# Patient Record
Sex: MALE | Race: WHITE | Employment: FULL TIME | URBAN - METROPOLITAN AREA
[De-identification: names, ages, dates, MRNs, and addresses within clinical notes are randomized per-mention and may not be internally consistent; named-entity substitution may affect disease eponyms.]

---

## 2017-02-21 ENCOUNTER — TELEPHONE (OUTPATIENT)
Dept: FAMILY MEDICINE | Facility: CLINIC | Age: 47
End: 2017-02-21

## 2017-02-21 ENCOUNTER — APPOINTMENT (OUTPATIENT)
Dept: GENERAL RADIOLOGY | Facility: CLINIC | Age: 47
End: 2017-02-21
Attending: FAMILY MEDICINE
Payer: COMMERCIAL

## 2017-02-21 ENCOUNTER — HOSPITAL ENCOUNTER (EMERGENCY)
Facility: CLINIC | Age: 47
Discharge: HOME | End: 2017-02-21
Attending: FAMILY MEDICINE | Admitting: FAMILY MEDICINE
Payer: COMMERCIAL

## 2017-02-21 VITALS
SYSTOLIC BLOOD PRESSURE: 138 MMHG | TEMPERATURE: 98.4 F | DIASTOLIC BLOOD PRESSURE: 106 MMHG | BODY MASS INDEX: 26.53 KG/M2 | HEART RATE: 109 BPM | HEIGHT: 67 IN | RESPIRATION RATE: 16 BRPM | WEIGHT: 169 LBS | OXYGEN SATURATION: 100 %

## 2017-02-21 DIAGNOSIS — R05.9 COUGH: ICD-10-CM

## 2017-02-21 DIAGNOSIS — Z87.891 PERSONAL HISTORY OF TOBACCO USE, PRESENTING HAZARDS TO HEALTH: ICD-10-CM

## 2017-02-21 DIAGNOSIS — J98.01 ACUTE BRONCHOSPASM: ICD-10-CM

## 2017-02-21 DIAGNOSIS — J11.1 INFLUENZA-LIKE ILLNESS: ICD-10-CM

## 2017-02-21 DIAGNOSIS — J11.1 FLU: ICD-10-CM

## 2017-02-21 LAB
FLUAV+FLUBV AG SPEC QL: NEGATIVE
FLUAV+FLUBV AG SPEC QL: NORMAL
SPECIMEN SOURCE: NORMAL

## 2017-02-21 PROCEDURE — 87804 INFLUENZA ASSAY W/OPTIC: CPT | Performed by: EMERGENCY MEDICINE

## 2017-02-21 PROCEDURE — 25000132 ZZH RX MED GY IP 250 OP 250 PS 637: Performed by: FAMILY MEDICINE

## 2017-02-21 PROCEDURE — 94640 AIRWAY INHALATION TREATMENT: CPT | Performed by: FAMILY MEDICINE

## 2017-02-21 PROCEDURE — 71020 XR CHEST 2 VW: CPT

## 2017-02-21 PROCEDURE — 99284 EMERGENCY DEPT VISIT MOD MDM: CPT | Mod: 25 | Performed by: FAMILY MEDICINE

## 2017-02-21 PROCEDURE — 99284 EMERGENCY DEPT VISIT MOD MDM: CPT | Mod: Z6 | Performed by: FAMILY MEDICINE

## 2017-02-21 PROCEDURE — 25000308 HC RX OP HPI UCR WEL MED 250 IP 250: Performed by: FAMILY MEDICINE

## 2017-02-21 RX ORDER — ALBUTEROL SULFATE 0.83 MG/ML
2.5 SOLUTION RESPIRATORY (INHALATION) ONCE
Status: COMPLETED | OUTPATIENT
Start: 2017-02-21 | End: 2017-02-21

## 2017-02-21 RX ORDER — ALBUTEROL SULFATE 90 UG/1
2 AEROSOL, METERED RESPIRATORY (INHALATION) EVERY 4 HOURS PRN
Qty: 1 INHALER | Refills: 0 | Status: SHIPPED | OUTPATIENT
Start: 2017-02-21 | End: 2017-04-06

## 2017-02-21 RX ORDER — OSELTAMIVIR PHOSPHATE 75 MG/1
75 CAPSULE ORAL 2 TIMES DAILY
Qty: 10 CAPSULE | Refills: 0 | Status: SHIPPED | OUTPATIENT
Start: 2017-02-21 | End: 2017-02-26

## 2017-02-21 RX ORDER — ACETAMINOPHEN 500 MG
1000 TABLET ORAL ONCE
Status: COMPLETED | OUTPATIENT
Start: 2017-02-21 | End: 2017-02-21

## 2017-02-21 RX ADMIN — ACETAMINOPHEN 1000 MG: 500 TABLET, FILM COATED ORAL at 19:41

## 2017-02-21 RX ADMIN — ALBUTEROL SULFATE 2.5 MG: 2.5 SOLUTION RESPIRATORY (INHALATION) at 19:41

## 2017-02-21 ASSESSMENT — ENCOUNTER SYMPTOMS
HEADACHES: 1
FEVER: 1
ABDOMINAL PAIN: 0
WHEEZING: 1
DIAPHORESIS: 1
WEAKNESS: 1
CHILLS: 1
COUGH: 1

## 2017-02-21 NOTE — TELEPHONE ENCOUNTER
"Jayce Myers is a 46 year old male who calls with cough.    NURSING ASSESSMENT:  Description:  Patient calling with a cough - started in last 24 hours  Onset/duration:  24 hours  Precip. factors:  No influenza vaccination  Associated symptoms:    1. Cough - wheezing with coughing but not at rest    -\"hard for me to take a deep breathe\"   -SOB - no wheezing heard on the phone - patient is speaking continuously with no problems or break in words - difficult to speak with patient due to his continuous speech - worsening SOB with activity   -patient states he is having 'difficulty breathing'    2. No influenza vaccination - reports recent \"influenza like\" symptoms a couple of weeks ago  3. Cold/clammy - has not checked temperature - worsens with coughing     symptoms a few weeks ago - was not diagnosed or seen    Last exam/Treatment:  7/20/2016  Allergies:   Allergies   Allergen Reactions     Pollen Extract Itching     Hayfever       NURSING PLAN: Nursing advice to patient Writer reviewed and assessed patients SOB with him repeated and he continues to states he is having difficulty breathing - advised patient seek care at [facility] at this time - someone else to drive - 911 if unable to make it - patient verbalized understanding - agrees with plan    RECOMMENDED DISPOSITION:  To ED, another person to drive - see above  Will comply with recommendation: Yes  If further questions/concerns or if symptoms do not improve, worsen or new symptoms develop, call your PCP or [facility] as soon as possible.      Guideline used:  Telephone Triage Protocols for Nurses, Fifth Edition, Bonny Cevallos RN    "

## 2017-02-21 NOTE — ED AVS SNAPSHOT
[facility], Emergency Department    [address] [facility] [address]    Phone:  [phone]                                       Jayce Myers   MRN: [id number]    Department:  [facility], [place], Emergency Department   Date of Visit:  2/21/2017           Patient Information     Date Of Birth          1970        Your diagnoses for this visit were:     Influenza-like illness     Cough     Acute bronchospasm        You were seen by Chapo Kumar MD.      Follow-up Information     Schedule an appointment as soon as possible for a visit with Chapo Maldonado MD.    Specialty:  Family Practice    Contact information:    [facility]  [address] [facility] [address]  [facility] [address] [phone]          Discharge Instructions       Home.  Your influenza test was negative but you still have influenza symptoms.  Home from work.  Take the tamiflu as directed.  Use albuterol for wheezing and cough.  Alternate tylenol and ibuprofen every 4 hours as needed.  Push fluids and rest.  Return if any concerns.    Bronchospasm (Adult)    Bronchospasm occurs when the airways (bronchial tubes) go into spasm and contract. This makes it hard to breathe and causes wheezing (a high-pitched whistling sound). Bronchospasm can also cause frequent coughing without wheezing.  Bronchospasm is due to irritation, inflammation, or allergic reaction of the airways. People with asthma get bronchospasm. However, not everyone with bronchospasm has asthma.  Being exposed to harmful fumes, a recent case of bronchitis, exercise, or a flare-up of chronic obstructive pulmonary disease (COPD) may cause the airways to spasm. An episode of bronchospasm may last 7 to 14 days. Medicine may be prescribed to relax the airways and prevent wheezing. Antibiotics will be prescribed only if your healthcare provider thinks there is a bacterial infection. Antibiotics do not help a viral infection.  Home care     Drink lots of water or  other fluids (at least 10 glasses a day) during an attack. This will loosen lung secretions and make it easier to breathe. If you have heart or kidney disease, check with your doctor before you drink extra fluids.    Take prescribed medicine exactly at the times advised. If you take an inhaled medicine to help with breathing, do not use it more than once every 4 hours, unless told to do so. If prescribed an antibiotic or prednisone, take all of the medicine, even if you are feeling better after a few days.    Do not smoke. Also avoid being exposed to secondhand smoke.    If you were given an inhaler, use it exactly as directed. If you need to use it more often than prescribed, your condition may be getting worse. Contact your healthcare provider.  Follow-up care  Follow up with your healthcare provider, or as advised.   (Note: If you are age 65 or older, have a chronic lung disease or condition that affects your immune system, or you smoke, we recommend getting pneumococcal vaccinations, as well as an influenza vaccination (flu shot) every autumn. Ask your healthcare provider about this.)  When to seek medical advice  Call your healthcare provider right away if any of these occur:    You need to use your inhalers more often than usual.    You develop a fever of 100.4 F (38 C) or higher.    You are coughing up lots of dark-colored sputum (mucus).    You do not start to improve within 24 hours.  Call 911, or get immediate medical care  Contact emergency services if any of these occur:    Coughing up bloody sputum (mucus)    Chest pain with each breath    Increased wheezing or shortness of breath    2184-5771 [employer]. [address] [place] [address], [place] [address]. All rights reserved. This information is not intended as a substitute for professional medical care. Always follow your healthcare professional's instructions.          Discharge References/Attachments     INFLUENZA (ADULT) (ENGLISH)      24  Hour Appointment Hotline       To make an appointment at any [facility], call [phone] ([phone]). If you don't have a family doctor or clinic, we will help you find one. [facility] clinics are conveniently located to serve the needs of you and your family.             Review of your medicines      START taking        Dose / Directions Last dose taken    albuterol 108 (90 BASE) MCG/ACT Inhaler   Commonly known as:  albuterol   Dose:  2 puff   Quantity:  1 Inhaler        Inhale 2 puffs into the lungs every 4 hours as needed for shortness of breath / dyspnea   Refills:  0        oseltamivir 75 MG capsule   Commonly known as:  TAMIFLU   Dose:  75 mg   Quantity:  10 capsule        Take 1 capsule (75 mg) by mouth 2 times daily for 5 days   Refills:  0          Our records show that you are taking the medicines listed below. If these are incorrect, please call your family doctor or clinic.        Dose / Directions Last dose taken    IBUPROFEN PO   Dose:  400 mg        Take 400 mg by mouth every 4 hours as needed for moderate pain   Refills:  0                Prescriptions were sent or printed at these locations (2 Prescriptions)                   Other Prescriptions                Printed at Department/Unit printer (2 of 2)         oseltamivir (TAMIFLU) 75 MG capsule               albuterol (ALBUTEROL) 108 (90 BASE) MCG/ACT Inhaler                Procedures and tests performed during your visit     Influenza A/B antigen    XR Chest 2 Views      Orders Needing Specimen Collection     None      Pending Results     No orders found from 2/19/2017 to 2/22/2017.            Pending Culture Results     No orders found from 2/19/2017 to 2/22/2017.            Thank you for choosing [facility]       Thank you for choosing [facility] for your care. Our goal is always to provide you with excellent care. Hearing back from our patients is one way we can continue to improve our services. Please take a few minutes to complete  the written survey that you may receive in the mail after you visit with us. Thank you!        [employer] Information     [employer] gives you secure access to your electronic health record. If you see a primary care provider, you can also send messages to your care team and make appointments. If you have questions, please call your primary care clinic.  If you do not have a primary care provider, please call [phone] and they will assist you.        Care EveryWhere ID     This is your Care EveryWhere ID. This could be used by other organizations to access your [facility] medical records  [id number]        After Visit Summary       This is your record. Keep this with you and show to your community pharmacist(s) and doctor(s) at your next visit.

## 2017-02-21 NOTE — ED NOTES
Jayce comes in for 24 hours of coughing and HA and low-grade fevers. He tells me that the HA is much worse with movement and coughing.

## 2017-02-21 NOTE — ED AVS SNAPSHOT
[facility], [place], Emergency Department    [address] [place] [address]    Phone:  [phone]                                       Jayce Myers   MRN: [id number]    Department:  [facility], Emergency Department   Date of Visit:  2/21/2017           After Visit Summary Signature Page     I have received my discharge instructions, and my questions have been answered. I have discussed any challenges I see with this plan with the nurse or doctor.    ..........................................................................................................................................  Patient/Patient Representative Signature      ..........................................................................................................................................  Patient Representative Print Name and Relationship to Patient    ..................................................               ................................................  Date                                            Time    ..........................................................................................................................................  Reviewed by Signature/Title    ...................................................              ..............................................  Date                                                            Time

## 2017-02-21 NOTE — LETTER
[facility], [place], EMERGENCY DEPARTMENT  [address] [facility] [address]  [phone]    2017    Jayce Myers  [address]  [facility] [address]  [phone] (home)     : 1970      To Whom it may concern:    Jayce Myers was seen in our Emergency Department today, 2017.  I expect his condition to improve over the next few days.  He may return to work when improved.    Sincerely,        Chapo Kumar MD

## 2017-02-21 NOTE — TELEPHONE ENCOUNTER
Reason for call:  Patient reporting a symptom    Symptom or request: Has had a cough where it hurts when he coughs for the last 24hours. Is not aware of a fever if he has one. Wondering when he should decide if he should be seen or not.    Duration (how long have symptoms been present): For at least 24 hours    Have you been treated for this before?     Additional comments:     Phone Number patient can be reached at:  Home number on file [phone] (home)    Best Time:  anytime    Can we leave a detailed message on this number:  Not Applicable    Call taken on 2/21/2017 at 2:50 PM by Lucia Jones

## 2017-02-22 ASSESSMENT — ENCOUNTER SYMPTOMS
NAUSEA: 0
BACK PAIN: 0
FLANK PAIN: 0
DIARRHEA: 0
DIFFICULTY URINATING: 0
JOINT SWELLING: 0
SORE THROAT: 1
CONFUSION: 0
HEMATURIA: 0
DECREASED CONCENTRATION: 1
WOUND: 0
COLOR CHANGE: 0
NECK STIFFNESS: 0
MYALGIAS: 1
SHORTNESS OF BREATH: 0
ACTIVITY CHANGE: 1
VOMITING: 0
EYE REDNESS: 0
DYSPHORIC MOOD: 1
ARTHRALGIAS: 1

## 2017-02-22 NOTE — DISCHARGE INSTRUCTIONS
Home.  Your influenza test was negative but you still have influenza symptoms.  Home from work.  Take the tamiflu as directed.  Use albuterol for wheezing and cough.  Alternate tylenol and ibuprofen every 4 hours as needed.  Push fluids and rest.  Return if any concerns.    Bronchospasm (Adult)    Bronchospasm occurs when the airways (bronchial tubes) go into spasm and contract. This makes it hard to breathe and causes wheezing (a high-pitched whistling sound). Bronchospasm can also cause frequent coughing without wheezing.  Bronchospasm is due to irritation, inflammation, or allergic reaction of the airways. People with asthma get bronchospasm. However, not everyone with bronchospasm has asthma.  Being exposed to harmful fumes, a recent case of bronchitis, exercise, or a flare-up of chronic obstructive pulmonary disease (COPD) may cause the airways to spasm. An episode of bronchospasm may last 7 to 14 days. Medicine may be prescribed to relax the airways and prevent wheezing. Antibiotics will be prescribed only if your healthcare provider thinks there is a bacterial infection. Antibiotics do not help a viral infection.  Home care     Drink lots of water or other fluids (at least 10 glasses a day) during an attack. This will loosen lung secretions and make it easier to breathe. If you have heart or kidney disease, check with your doctor before you drink extra fluids.    Take prescribed medicine exactly at the times advised. If you take an inhaled medicine to help with breathing, do not use it more than once every 4 hours, unless told to do so. If prescribed an antibiotic or prednisone, take all of the medicine, even if you are feeling better after a few days.    Do not smoke. Also avoid being exposed to secondhand smoke.    If you were given an inhaler, use it exactly as directed. If you need to use it more often than prescribed, your condition may be getting worse. Contact your healthcare provider.  Follow-up  care  Follow up with your healthcare provider, or as advised.   (Note: If you are age 65 or older, have a chronic lung disease or condition that affects your immune system, or you smoke, we recommend getting pneumococcal vaccinations, as well as an influenza vaccination (flu shot) every autumn. Ask your healthcare provider about this.)  When to seek medical advice  Call your healthcare provider right away if any of these occur:    You need to use your inhalers more often than usual.    You develop a fever of 100.4 F (38 C) or higher.    You are coughing up lots of dark-colored sputum (mucus).    You do not start to improve within 24 hours.  Call 911, or get immediate medical care  Contact emergency services if any of these occur:    Coughing up bloody sputum (mucus)    Chest pain with each breath    Increased wheezing or shortness of breath    6153-1034 [employer]. [address] [place] [address]. All rights reserved. This information is not intended as a substitute for professional medical care. Always follow your healthcare professional's instructions.

## 2017-02-22 NOTE — ED PROVIDER NOTES
History     Chief Complaint   Patient presents with     Cough     Headache     HPI  Jayce Myers is a 46 year old male who presents with multiple complaints. The patient reports that over the past day he has been experiencing headache, cough, wheezing, subjective fever, chills, diaphoresis and generalized weakness. He did take 2 Advil at 12:30 PM today for his symptoms. The patient notes that he had a different illness 2-3 weeks ago, in which which he had been experiencing myalgias, nausea, vomiting and diarrhea which lasted a few days and eventually resolved. He has not had a flu shot this year. He denies any abdominal pain, nausea, vomiting or diarrhea currently. The patient has no history of asthma.  Patient is concerned he has 2 children home and his wife they did get flu shots a couple weeks ago.    Past Medical History   Diagnosis Date     Nevus      left leg        No past surgical history on file.    Family History   Problem Relation Age of Onset     CANCER Mother      salivary gland?     Thyroid Disease Mother      DIABETES Mother      DM2     Anemia Sister      Jayce ROLON does not known cause     Cancer - colorectal No family hx of        Social History   Substance Use Topics     Smoking status: Former Smoker     Packs/day: 0.50     Years: 3.00     Types: Cigarettes     Quit date: 1/26/2003     Smokeless tobacco: Never Used      Comment: social smoking     Alcohol use Yes      Comment: six pack over weekend     No current facility-administered medications for this encounter.      Current Outpatient Prescriptions   Medication     IBUPROFEN PO     oseltamivir (TAMIFLU) 75 MG capsule     albuterol (ALBUTEROL) 108 (90 BASE) MCG/ACT Inhaler        Allergies   Allergen Reactions     Pollen Extract Itching     Hayfever        I have reviewed the Medications, Allergies, Past Medical and Surgical History, and Social History in the Epic system.    Review of Systems   Constitutional: Positive for activity  "change, chills, diaphoresis and fever (subjective).   HENT: Positive for sore throat (Mild). Negative for congestion.    Eyes: Negative for redness.   Respiratory: Positive for cough and wheezing. Negative for shortness of breath.    Cardiovascular: Negative for chest pain.   Gastrointestinal: Negative for abdominal pain, diarrhea, nausea and vomiting.   Genitourinary: Negative for difficulty urinating, flank pain and hematuria.   Musculoskeletal: Positive for arthralgias and myalgias. Negative for back pain, joint swelling and neck stiffness.   Skin: Negative for color change, rash and wound.   Allergic/Immunologic: Negative for immunocompromised state.   Neurological: Positive for weakness (generalized weakness) and headaches.   Psychiatric/Behavioral: Positive for decreased concentration and dysphoric mood. Negative for confusion.   All other systems reviewed and are negative.      Physical Exam   BP: (!) 146/95  Pulse: 109  Temp: 99.5  F (37.5  C)  Resp: 20  Height: 170.2 cm (5' 7\")  Weight: 76.7 kg (169 lb)  SpO2: 99 %  Physical Exam   Constitutional: He is oriented to person, place, and time. He appears well-developed and well-nourished. He appears distressed.   Patient appears influenzal.   HENT:   Head: Normocephalic and atraumatic.   Oropharynx without trismus some erythema   Eyes: Conjunctivae and EOM are normal. Pupils are equal, round, and reactive to light. No scleral icterus.   Neck: Normal range of motion. Neck supple. No JVD present.   No stridor   Cardiovascular: Regular rhythm.    Pulmonary/Chest: No stridor. He is in respiratory distress (mild). He has wheezes. He has no rales. He exhibits no tenderness.   Abdominal: He exhibits no distension and no mass. There is no tenderness. There is no rebound and no guarding.   Musculoskeletal: He exhibits no edema or tenderness.   Lymphadenopathy:     He has cervical adenopathy.   Neurological: He is alert and oriented to person, place, and time. He has " normal reflexes. No cranial nerve deficit. Coordination normal.   Skin: Skin is warm and dry. No rash noted. He is not diaphoretic. No erythema. No pallor.   Psychiatric:   Mildly anxious   Nursing note and vitals reviewed.      ED Course     ED Course     Procedures     6:53 PM  The patient was seen and examined by Dr. Kumar in Room HW.           Patient evaluated here.  Influenza testing was negative chest x-ray without acute infiltrate.  Patient received a gram of Tylenol feeling somewhat better also albuterol neb did improve his symptoms.    Discussed at length with patient regarding treatment his symptoms seem to be consistent with acute influenza illness  Patient has 2 children at home along with his wife about his ability working etc. therefore we did elect to treat with Tamiflu for 5 days along with this albuterol inhaler note for work was given encouraging fluids alternate Tylenol ibuprofen and return if any concerns patient agrees with plan and comfortable being discharged feeling better.    Critical Care time:  none               Labs Ordered and Resulted from Time of ED Arrival Up to the Time of Departure from the ED   INFLUENZA A/B ANTIGEN     Results for orders placed or performed during the hospital encounter of 02/21/17 (from the past 24 hour(s))   Influenza A/B antigen   Result Value Ref Range    Influenza A/B Agn Specimen Nasopharyngeal     Influenza A Negative NEG    Influenza B  NEG     Negative   Test results must be correlated with clinical data. If necessary, results   should be confirmed by a molecular assay or viral culture.     XR Chest 2 Views    Narrative     Examination:  XR CHEST 2 VW     Date:  2/21/2017 7:30 PM      Clinical Information: sob     Additional Information: none    Comparison: none    Findings:     The lungs are well-expanded and clear. The mediastinum and heart are  normal. The osseous structures of the thorax and spine are  unremarkable. There is an incidental azygous  lobe. There are prominent  loops of small bowel in the left upper quadrant of the abdomen but  they are not distended. There is no free air.      Impression    Impression:    1. No acute disease noted within the chest or upper abdomen. .    REBEKAH JETER MD         Assessments & Plan (with Medical Decision Making)  46-year-old male presents with acute influenzal symptoms cough myalgias or arthralgias coming on suddenly low-grade fever although rapid flu negative high suspicion for influenza.  Chest x-ray negative.  With family contacts at home etc.  Patient did elect to take Tamiflu for 5 days using albuterol inhaler Tylenol ibuprofen and fluids work note and to follow-up M.D. return if any concerns.           I have reviewed the nursing notes.    I have reviewed the findings, diagnosis, plan and need for follow up with the patient.    Discharge Medication List as of 2/21/2017  9:03 PM      START taking these medications    Details   oseltamivir (TAMIFLU) 75 MG capsule Take 1 capsule (75 mg) by mouth 2 times daily for 5 days, Disp-10 capsule, R-0, Local Print      albuterol (ALBUTEROL) 108 (90 BASE) MCG/ACT Inhaler Inhale 2 puffs into the lungs every 4 hours as needed for shortness of breath / dyspnea, Disp-1 Inhaler, R-0, Local Print             Final diagnoses:   Influenza-like illness   Cough   Acute bronchospasm     IPaula, am serving as a trained medical scribe to document services personally performed by Chapo Kumar MD, based on the provider's statements to me.   IChapo MD, was physically present and have reviewed and verified the accuracy of this note documented by Paula Ramos.     2/21/2017   [facility] EMERGENCY DEPARTMENT    This note was created at least in part by the use of dragon voice dictation system. Inadvertent typographical errors may still exist.  Chapo Kumar MD.         Chapo Kumar MD  02/22/17 0042

## 2017-02-23 ENCOUNTER — TELEPHONE (OUTPATIENT)
Dept: FAMILY MEDICINE | Facility: CLINIC | Age: 47
End: 2017-02-23

## 2017-02-23 NOTE — TELEPHONE ENCOUNTER
"Patient called clinic. He was seen in the ED on 2/21/17.   He currently reports the following symptoms:     -painful throat with \"voice loss\"   -coughing up mucous  -reports no difficulty breathing  -reports no wheezing or SOB   -patient reports he has used his inhaler as prescribed  -current temperature at 1610 today was 99.0 oral   -alternating tylenol and ibuprofen, last dose at 10:00am  -patient reports that he \"feels better\" than when he was in the ED on 2/21/17  -reports good urine output, able to keep food and fluids down  -currently taking Tamiflu as prescribed    Home care instructions provided to patient. Asked patient to continue to monitor symptoms and take medications as prescribed by ED MD. Asked patient to call clinic for follow up or go to UC/ED with any new or worsening symptoms. Patient stated his understanding. He denied further questions at this time. Closing encounter.    Joanna Odell RN  [facility]                  "

## 2017-02-23 NOTE — TELEPHONE ENCOUNTER
Pt's wife is calling in regarding a f/u as he was seen in the ER and is worried since pt is sounding very hoarse after being seen    Wife Radha can be reached @ [phone] bhavna

## 2017-04-06 ENCOUNTER — APPOINTMENT (OUTPATIENT)
Dept: GENERAL RADIOLOGY | Facility: CLINIC | Age: 47
End: 2017-04-06
Attending: EMERGENCY MEDICINE
Payer: COMMERCIAL

## 2017-04-06 ENCOUNTER — HOSPITAL ENCOUNTER (OUTPATIENT)
Facility: CLINIC | Age: 47
Setting detail: OBSERVATION
Discharge: HOME | End: 2017-04-07
Attending: EMERGENCY MEDICINE | Admitting: EMERGENCY MEDICINE
Payer: COMMERCIAL

## 2017-04-06 DIAGNOSIS — R07.89 COSTOCHONDRAL CHEST PAIN: Primary | ICD-10-CM

## 2017-04-06 DIAGNOSIS — R07.9 CHEST PAIN, UNSPECIFIED TYPE: ICD-10-CM

## 2017-04-06 LAB
ALBUMIN SERPL-MCNC: 3.4 G/DL (ref 3.4–5)
ALP SERPL-CCNC: 73 U/L (ref 40–150)
ALT SERPL W P-5'-P-CCNC: 52 U/L (ref 0–70)
ANION GAP SERPL CALCULATED.3IONS-SCNC: 10 MMOL/L (ref 3–14)
AST SERPL W P-5'-P-CCNC: 19 U/L (ref 0–45)
BASOPHILS # BLD AUTO: 0 10E9/L (ref 0–0.2)
BASOPHILS NFR BLD AUTO: 0.2 %
BILIRUB DIRECT SERPL-MCNC: <0.1 MG/DL (ref 0–0.2)
BILIRUB SERPL-MCNC: 0.3 MG/DL (ref 0.2–1.3)
BUN SERPL-MCNC: 18 MG/DL (ref 7–30)
CALCIUM SERPL-MCNC: 8.8 MG/DL (ref 8.5–10.1)
CHLORIDE SERPL-SCNC: 106 MMOL/L (ref 94–109)
CO2 SERPL-SCNC: 25 MMOL/L (ref 20–32)
CREAT SERPL-MCNC: 0.98 MG/DL (ref 0.66–1.25)
DIFFERENTIAL METHOD BLD: NORMAL
EOSINOPHIL # BLD AUTO: 0.3 10E9/L (ref 0–0.7)
EOSINOPHIL NFR BLD AUTO: 4.4 %
ERYTHROCYTE [DISTWIDTH] IN BLOOD BY AUTOMATED COUNT: 13.4 % (ref 10–15)
GFR SERPL CREATININE-BSD FRML MDRD: 82 ML/MIN/1.7M2
GLUCOSE SERPL-MCNC: 146 MG/DL (ref 70–99)
HCT VFR BLD AUTO: 46 % (ref 40–53)
HGB BLD-MCNC: 15.6 G/DL (ref 13.3–17.7)
IMM GRANULOCYTES # BLD: 0 10E9/L (ref 0–0.4)
IMM GRANULOCYTES NFR BLD: 0.2 %
LYMPHOCYTES # BLD AUTO: 2.2 10E9/L (ref 0.8–5.3)
LYMPHOCYTES NFR BLD AUTO: 38.2 %
MAGNESIUM SERPL-MCNC: 2.3 MG/DL (ref 1.6–2.3)
MCH RBC QN AUTO: 30.4 PG (ref 26.5–33)
MCHC RBC AUTO-ENTMCNC: 33.9 G/DL (ref 31.5–36.5)
MCV RBC AUTO: 90 FL (ref 78–100)
MONOCYTES # BLD AUTO: 0.3 10E9/L (ref 0–1.3)
MONOCYTES NFR BLD AUTO: 4.6 %
NEUTROPHILS # BLD AUTO: 3 10E9/L (ref 1.6–8.3)
NEUTROPHILS NFR BLD AUTO: 52.4 %
NRBC # BLD AUTO: 0 10*3/UL
NRBC BLD AUTO-RTO: 0 /100
PHOSPHATE SERPL-MCNC: 3.6 MG/DL (ref 2.5–4.5)
PLATELET # BLD AUTO: 256 10E9/L (ref 150–450)
POTASSIUM SERPL-SCNC: 3.5 MMOL/L (ref 3.4–5.3)
PROT SERPL-MCNC: 6.9 G/DL (ref 6.8–8.8)
RBC # BLD AUTO: 5.14 10E12/L (ref 4.4–5.9)
SODIUM SERPL-SCNC: 141 MMOL/L (ref 133–144)
TROPONIN I BLD-MCNC: 0 UG/L (ref 0–0.1)
TROPONIN I BLD-MCNC: 0 UG/L (ref 0–0.1)
TROPONIN I SERPL-MCNC: NORMAL UG/L (ref 0–0.04)
WBC # BLD AUTO: 5.7 10E9/L (ref 4–11)

## 2017-04-06 PROCEDURE — 80076 HEPATIC FUNCTION PANEL: CPT | Performed by: PHYSICIAN ASSISTANT

## 2017-04-06 PROCEDURE — 36415 COLL VENOUS BLD VENIPUNCTURE: CPT | Performed by: PHYSICIAN ASSISTANT

## 2017-04-06 PROCEDURE — 84484 ASSAY OF TROPONIN QUANT: CPT | Mod: 91 | Performed by: PHYSICIAN ASSISTANT

## 2017-04-06 PROCEDURE — 83735 ASSAY OF MAGNESIUM: CPT | Performed by: PHYSICIAN ASSISTANT

## 2017-04-06 PROCEDURE — 93010 ELECTROCARDIOGRAM REPORT: CPT | Mod: Z6 | Performed by: EMERGENCY MEDICINE

## 2017-04-06 PROCEDURE — 84484 ASSAY OF TROPONIN QUANT: CPT | Performed by: EMERGENCY MEDICINE

## 2017-04-06 PROCEDURE — 80048 BASIC METABOLIC PNL TOTAL CA: CPT | Performed by: EMERGENCY MEDICINE

## 2017-04-06 PROCEDURE — 25000132 ZZH RX MED GY IP 250 OP 250 PS 637: Performed by: PHYSICIAN ASSISTANT

## 2017-04-06 PROCEDURE — 84484 ASSAY OF TROPONIN QUANT: CPT | Mod: 91

## 2017-04-06 PROCEDURE — 99285 EMERGENCY DEPT VISIT HI MDM: CPT | Performed by: EMERGENCY MEDICINE

## 2017-04-06 PROCEDURE — 85025 COMPLETE CBC W/AUTO DIFF WBC: CPT | Performed by: EMERGENCY MEDICINE

## 2017-04-06 PROCEDURE — 84484 ASSAY OF TROPONIN QUANT: CPT | Mod: 91 | Performed by: NURSE PRACTITIONER

## 2017-04-06 PROCEDURE — 71020 XR CHEST 2 VW: CPT

## 2017-04-06 PROCEDURE — 99207 ZZC APP CREDIT; MD BILLING SHARED VISIT: CPT | Mod: 25 | Performed by: EMERGENCY MEDICINE

## 2017-04-06 PROCEDURE — 99220 ZZC INITIAL OBSERVATION CARE,LEVL III: CPT | Mod: Z6 | Performed by: PHYSICIAN ASSISTANT

## 2017-04-06 PROCEDURE — 84100 ASSAY OF PHOSPHORUS: CPT | Performed by: PHYSICIAN ASSISTANT

## 2017-04-06 PROCEDURE — 93005 ELECTROCARDIOGRAM TRACING: CPT | Performed by: EMERGENCY MEDICINE

## 2017-04-06 PROCEDURE — G0378 HOSPITAL OBSERVATION PER HR: HCPCS

## 2017-04-06 RX ORDER — ASPIRIN 81 MG/1
162 TABLET, CHEWABLE ORAL ONCE
Status: DISCONTINUED | OUTPATIENT
Start: 2017-04-06 | End: 2017-04-06

## 2017-04-06 RX ORDER — ACETAMINOPHEN 325 MG/1
650 TABLET ORAL EVERY 4 HOURS PRN
Status: DISCONTINUED | OUTPATIENT
Start: 2017-04-06 | End: 2017-04-07 | Stop reason: HOSPADM

## 2017-04-06 RX ORDER — IBUPROFEN 400 MG/1
400 TABLET, FILM COATED ORAL ONCE
Status: COMPLETED | OUTPATIENT
Start: 2017-04-06 | End: 2017-04-06

## 2017-04-06 RX ORDER — LIDOCAINE 40 MG/G
CREAM TOPICAL
Status: DISCONTINUED | OUTPATIENT
Start: 2017-04-06 | End: 2017-04-07 | Stop reason: HOSPADM

## 2017-04-06 RX ORDER — ASPIRIN 81 MG/1
81 TABLET ORAL DAILY
Status: DISCONTINUED | OUTPATIENT
Start: 2017-04-07 | End: 2017-04-07 | Stop reason: HOSPADM

## 2017-04-06 RX ORDER — ACETAMINOPHEN 650 MG/1
650 SUPPOSITORY RECTAL EVERY 4 HOURS PRN
Status: DISCONTINUED | OUTPATIENT
Start: 2017-04-06 | End: 2017-04-07 | Stop reason: HOSPADM

## 2017-04-06 RX ORDER — NITROGLYCERIN 0.4 MG/1
0.4 TABLET SUBLINGUAL EVERY 5 MIN PRN
Status: DISCONTINUED | OUTPATIENT
Start: 2017-04-06 | End: 2017-04-07 | Stop reason: HOSPADM

## 2017-04-06 RX ORDER — NALOXONE HYDROCHLORIDE 0.4 MG/ML
.1-.4 INJECTION, SOLUTION INTRAMUSCULAR; INTRAVENOUS; SUBCUTANEOUS
Status: DISCONTINUED | OUTPATIENT
Start: 2017-04-06 | End: 2017-04-07 | Stop reason: HOSPADM

## 2017-04-06 RX ORDER — LORATADINE 10 MG/1
10 TABLET ORAL DAILY
COMMUNITY
End: 2018-06-30

## 2017-04-06 RX ORDER — ALUMINA, MAGNESIA, AND SIMETHICONE 2400; 2400; 240 MG/30ML; MG/30ML; MG/30ML
15-30 SUSPENSION ORAL EVERY 4 HOURS PRN
Status: DISCONTINUED | OUTPATIENT
Start: 2017-04-06 | End: 2017-04-07 | Stop reason: HOSPADM

## 2017-04-06 RX ADMIN — IBUPROFEN 400 MG: 400 TABLET ORAL at 22:04

## 2017-04-06 ASSESSMENT — ENCOUNTER SYMPTOMS
ABDOMINAL PAIN: 0
DIFFICULTY URINATING: 0
NECK STIFFNESS: 0
FEVER: 0
POLYDIPSIA: 0
BACK PAIN: 0
ADENOPATHY: 0
SHORTNESS OF BREATH: 0
CHILLS: 0
COUGH: 0
VOMITING: 0
NAUSEA: 0
AGITATION: 0
BRUISES/BLEEDS EASILY: 0
PALPITATIONS: 0
NECK PAIN: 0
LIGHT-HEADEDNESS: 0
COLOR CHANGE: 0

## 2017-04-06 ASSESSMENT — PAIN DESCRIPTION - DESCRIPTORS: DESCRIPTORS: ACHING

## 2017-04-06 NOTE — IP AVS SNAPSHOT
Unit [address]D Observation [address] [address]    Phone:  [phone]    Fax:  [phone]                                       After Visit Summary   4/6/2017    Jayce Myers    MRN: [id number]           After Visit Summary Signature Page     I have received my discharge instructions, and my questions have been answered. I have discussed any challenges I see with this plan with the nurse or doctor.    ..........................................................................................................................................  Patient/Patient Representative Signature      ..........................................................................................................................................  Patient Representative Print Name and Relationship to Patient    ..................................................               ................................................  Date                                            Time    ..........................................................................................................................................  Reviewed by Signature/Title    ...................................................              ..............................................  Date                                                            Time

## 2017-04-06 NOTE — ED NOTES
Observation Brochure and Video    Patient informed of observation status based on provider's order.  Observation video watched. Patient stated understanding. Questions answered.

## 2017-04-06 NOTE — ED PROVIDER NOTES
History     Chief Complaint   Patient presents with     Chest Pain     started 30 minutes ago, left chest  sharp pain     HPI  Jayce Myers is a 47 year old male who presents to the Emergency Department for evaluation of chest pain. Patient states he began experiencing left sided chest pain around 12:15pm today while sitting and eating lunch. Patient describes the pain as sharp, moderate, constant and radiating from the left chest inward and from the abdomen upward. The pain then subsided for about five minutes with subsequent recurrence and a continuation of this pattern with shorter intervals of absence. Due to the increased consistency of the pain, he decided to come into the ED. About 10 minutes prior to arrival patient reports experiencing diaphoresis in addition to numbness in his feet and arms. Patient denies taking medications on a daily basis other than Claritin. He is not an alcoholic and denies drug use. He denies a personal or family history of MI. He is a nonsmoker and denies any recent surgeries or hospitalizations.     PAST MEDICAL HISTORY  Past Medical History:   Diagnosis Date     Nevus     left leg      PAST SURGICAL HISTORY  History reviewed. No pertinent surgical history.  FAMILY HISTORY  Family History   Problem Relation Age of Onset     CANCER Mother      salivary gland?     Thyroid Disease Mother      DIABETES Mother      DM2     Anemia Sister      Jayce ROLON does not known cause     Cancer - colorectal No family hx of      SOCIAL HISTORY  Social History   Substance Use Topics     Smoking status: Former Smoker     Packs/day: 0.50     Years: 3.00     Types: Cigarettes     Quit date: 1/26/2003     Smokeless tobacco: Never Used      Comment: social smoking     Alcohol use No     MEDICATIONS  No current facility-administered medications for this encounter.      Current Outpatient Prescriptions   Medication     loratadine (CLARITIN) 10 MG tablet     IBUPROFEN PO     ALLERGIES  Allergies    Allergen Reactions     Pollen Extract Itching     Hayfever       I have reviewed the Medications, Allergies, Past Medical and Surgical History, and Social History in the Epic system.    Review of Systems   Constitutional: Negative for chills and fever.   HENT: Negative for congestion.    Eyes: Negative for visual disturbance.   Respiratory: Negative for cough and shortness of breath.    Cardiovascular: Positive for chest pain. Negative for palpitations and leg swelling.   Gastrointestinal: Negative for abdominal pain, nausea and vomiting.   Endocrine: Negative for polydipsia and polyuria.   Genitourinary: Negative for difficulty urinating.   Musculoskeletal: Negative for back pain, neck pain and neck stiffness.   Skin: Negative for color change.   Neurological: Negative for light-headedness.   Hematological: Negative for adenopathy. Does not bruise/bleed easily.   Psychiatric/Behavioral: Negative for agitation and behavioral problems.       Physical Exam   BP: (!) 136/92  Pulse: 108  Temp: 97.9  F (36.6  C)  Resp: 16  Weight: 75.3 kg (166 lb)  SpO2: 98 %  Physical Exam   Constitutional: He is oriented to person, place, and time. He appears well-developed and well-nourished. No distress.   HENT:   Head: Normocephalic and atraumatic.   Mouth/Throat: Oropharynx is clear and moist. No oropharyngeal exudate.   Eyes: Conjunctivae and EOM are normal. No scleral icterus.   Neck: Normal range of motion. Neck supple.   Cardiovascular: Normal heart sounds and intact distal pulses.    tachycardia   Pulmonary/Chest: Effort normal and breath sounds normal. No respiratory distress. He has no wheezes. He has no rales.   Abdominal: Soft. Bowel sounds are normal. There is no tenderness.   Musculoskeletal: Normal range of motion. He exhibits no edema or tenderness.   Neurological: He is alert and oriented to person, place, and time. No cranial nerve deficit. He exhibits normal muscle tone. Coordination normal.   Skin: Skin is warm.  No rash noted. He is not diaphoretic.   Psychiatric: He has a normal mood and affect. His behavior is normal. Judgment and thought content normal.   Nursing note and vitals reviewed.      ED Course     ED Course     1:36 PM  The patient was seen and examined by Dr. Diaz in Room 1.     Procedures             EKG Interpretation:      Interpreted by Kayla Diaz  Time reviewed: 1:27 PM  Symptoms at time of EKG: chest pain  Rhythm: sinus tachycardia   Rate: 103  Axis: normal  Ectopy: none  Conduction: normal  ST Segments/ T Waves: No ST-T wave changes  Q Waves: none  Comparison to prior: Unchanged from September 21, 2014, other than the rate    Clinical Impression: sinus tachycardia          Critical Care time:  none                  Labs Ordered and Resulted from Time of ED Arrival Up to the Time of Departure from the ED   BASIC METABOLIC PANEL - Abnormal; Notable for the following:        Result Value    Glucose 146 (*)     All other components within normal limits   CBC WITH PLATELETS DIFFERENTIAL   TROPONIN I   TROPONIN I   PERIPHERAL IV CATHETER   CARDIAC CONTINUOUS MONITORING   PULSE OXIMETRY NURSING   ISTAT TROPONIN NURSING POCT   TROPONIN POCT       Assessments & Plan (with Medical Decision Making)   39-year-old man presenting with intermittent chest pain. Differential diagnosis: ACS, pneumonia, pneumothorax, esophageal spasm, GERD, unlikely aortic dissection or pulmonary embolus.    After thorough history and physical exam patient appears to be in no acute distress. I will obtain an EKG, chest x-ray and laboratory studies for further diagnostic evaluation. Patient agrees with the plan.     Patient s laboratory studies returned with no leukocytosis WBC is normal at 5,700. There is no anemia hemoglobin is normal at 15.6. Electrolytes showed no evidence of dehydration, creatinine is normal at 0.98. Troponin is undetectable. EKG showed no acute ischemic changes. I reviewed the chest x-ray and I read the  radiology report; there is no evidence of pneumonia, pneumothorax or widened mediastinum. Patient continued to have intermittent chest pain on the left side and therefore he was admitted to ED observation for further evaluation. He agrees with the plan.     This part of the medical record was transcribed by Shadia Alcantar Medical Scribe, from a dictation done by Kayla Diaz MD.     I have reviewed the nursing notes.    I have reviewed the findings, diagnosis, plan and need for follow up with the patient.    New Prescriptions    No medications on file       Final diagnoses:   Chest pain, unspecified type     I, Shadia Alcantar, am serving as a trained medical scribe to document services personally performed by Dante Diaz MD, based on the provider's statements to me.   I, Dante Diaz MD, was physically present and have reviewed and verified the accuracy of this note documented by Shadia Alcantar.      4/6/2017   [facility], [place], EMERGENCY DEPARTMENT     Kayla Diaz MD  04/06/17 1545

## 2017-04-06 NOTE — H&P
"[facility]   History & Physical    Jayce Myers MRN# [id number]   Age: 47 year old YOB: 1970     Date of Admission: 4/6/2017    Primary Care Provider: Chapo Maldonado         Chief Complaint:   \"chest pain\"         History of Present Illness:   Jayce Myers is a 47 year old male w/ no significant PMH who presented to the ED with chest pain.  Around 12:15pm this after after lunch while sitting at the lunch table he starting experiencing a sharp left upper chest pain that radiated the upper shoulder and back and also down into the left lower chest. The pain is described as sharp like a \"cat scratch\" that would come and go lasting no more than 30 seconds. This continued to be persistent and decided to come to the ED. He drove himself from work in and by the time he arrived at the hospital and was walking he started feeling lightheaded, nauseated, diaphoretic, paresthesias in hands and legs. He reports right around the time he arrived to the ED he started having a headache. Denies any palpitations. He admits that by the time he arrived at the ED there may have been some anxiety component to his symptoms. Denies any history of anything as such. He states he discontinued use of caffeine last year October however he has substituted it with a [place] tea called [place]er[place] similar to green with a lot of caffeine vs stimulant. He usually drinks a cup a day however he had 2 cups today. He denies any usual stress, recent travel, LE edema, h/o DVT/PE, constipation, heartburn, belching, bloated. He denies any illicit drug use. He report a very remote history of social tobacco use before age 19. No family history CAD.     In the ED the patient's vital signs were stable, he was afebrile.  Troponin negative, BMP and CBC unremarkable.  Glucose 146.  Chest x-ray negative.  EKG was unchanged with no ST/T wave changes, but did have sinus tachycardia with . He " is being admitted to the observation unit for ACS rule out.      On admission to the observation unit the patient without any chest pain but complained of an ongoing headache. He states he doesn't get headaches frequently but when he does it is similar and usually takes Advil. Off note he states he bumped his head on a metal object at work 2 days ago but there was no LOC at that time or headache or any nausea or vomiting.          Review of Systems:   A 10 point review of systems was performed and is negative unless otherwise noted in HPI.          Past Medical History:     Past Medical History:   Diagnosis Date     Nevus     left leg              Past Surgical History:    History reviewed. No pertinent surgical history.          Family History:     Family History   Problem Relation Age of Onset     CANCER Mother      salivary gland?     Thyroid Disease Mother      DIABETES Mother      DM2     Anemia Sister      ОЛЬГАYayaJayce does not known cause     Cancer - colorectal No family hx of              Social History:     Social History   Substance Use Topics     Smoking status: Former Smoker     Packs/day: 0.50     Years: 3.00     Types: Cigarettes     Quit date: 1/26/2003     Smokeless tobacco: Never Used      Comment: social smoking     Alcohol use No             Medications:     No current facility-administered medications on file prior to encounter.   Current Outpatient Prescriptions on File Prior to Encounter:  IBUPROFEN PO Take 400 mg by mouth every 4 hours as needed for moderate pain            Allergies:     Allergies   Allergen Reactions     Pollen Extract Itching     Hayfever             Physical Exam:   /86  Pulse 108  Temp 97.9  F (36.6  C) (Oral)  Resp 17  Wt 75.3 kg (166 lb)  SpO2 100%  BMI 26 kg/m2   GENERAL: Alert and oriented x 3. NAD.   HEENT: Anicteric sclera. PERRL. Mucous membranes moist and without lesions.   CV: RRR. S1, S2. No murmurs appreciated. No chest wall  "tenderness.  RESPIRATORY: Effort normal. Lungs CTAB with no wheezing, rales, rhonchi.   GI: Abdomen soft and non distended with normoactive bowel sounds present in all quadrants. No tenderness, rebound, guarding.   MUSCULOSKELETAL: No joint swelling or tenderness. Moves all extremities.   NEUROLOGICAL: No focal deficits. Strength 5/5 bilaterally in upper and lower extremities.   EXTREMITIES: No peripheral edema. Intact bilateral pedal pulses.   SKIN: No jaundice. No rashes.          Labs:   CBC:  Recent Labs   Lab Test  04/06/17   1336   WBC  5.7   RBC  5.14   HGB  15.6   HCT  46.0   MCV  90   MCH  30.4   MCHC  33.9   RDW  13.4   PLT  256       CMP:  Recent Labs   Lab Test  04/06/17   1336  10/24/16   1637   NA  141  139   POTASSIUM  3.5  3.6   CHLORIDE  106  104   VENKAT  8.8  9.1   CO2  25  26   BUN  18  20   CR  0.98  1.09   GLC  146*  92   AST   --   30   ALT   --   51   BILITOTAL   --   0.4   ALBUMIN   --   4.2   PROTTOTAL   --   8.1   ALKPHOS   --   72       INR:   Recent Labs   Lab Test  09/21/14   2245   INR  0.96            Imaging:   XR CHEST 2 VW 4/6/2017 2:51 PM      HISTORY: chest pain         IMPRESSION: Negative exam.     MARGARITA BARNEY MD         Assessment and Plan:   Jayce Myers is a 47 year old male w/ no significant PMH who presented to the ED with chest pain.      1. Chest pain:  Sudden onset of sharp left upper chest pain at rest radiated to upper shoulder and back and down into the left lower chest, described as sharp like a \"cat scratch\", intermittent, lasting no more than 30 seconds at a time. Just prior to arrival in ED while walking started feeling lightheaded, nauseated, diaphoretic, paresthesias in hands and legs. Denies any palpitations, h/o similar episode, usual stress, recent travel, LE edema, h/o DVT/PE, constipation, heartburn, belching, bloated. Increase in intake of [place] tea (similar to green with a lot of caffeine). No illicit drug use. Remote h/o social tobacco use " before age 19. No family history CAD. No prior ACS evaluations/stress tests. Last lipid panel in 2013 was elevated.     In ED, troponin negative, EKG with no acute ST/T wave changes, BMP and CBC unremarkable, glucose slightly elevated.   -serial troponins x 2 more  -aspirin 81mg daily  -continous telemetry  -SL nitro prn  -exercise stress echo in am  -fasting lipid panel in am    Discussed with Dr. Camarena.     FEN: Cardiac w/o caffeine, NPO at midnight  Prophylaxis: anticipate short stay  Code Status: Full        Signed: Reji Lucia PA-C   April 6, 2017  11:42 PM

## 2017-04-06 NOTE — PHARMACY-ADMISSION MEDICATION HISTORY
Admission Medication History status for the 4/6/2017 admission is complete.  See EPIC admission navigator for Prior to Admission medications.    Medication history sources:  Patient    Medication history source reliability: Good; patient knew his medication and dose    Medication adherence:  Not assessed    Changes made to PTA medication list (reason)  Added:   - Loratadine 10 mg po daily per patient. The patient reports this is a new medication he started taking 2 days ago.  Deleted: None  Changed: None    Additional medication history information (including reliability of information, actions taken by pharmacist): None    Time spent in this activity: 5 minutes    Medication history completed by: Tiago GrigsbyD    Prior to Admission medications    Medication Sig Last Dose Taking? Auth Provider   loratadine (CLARITIN) 10 MG tablet Take 10 mg by mouth daily 4/6/2017 Yes Unknown, Entered By History   IBUPROFEN PO Take 400 mg by mouth every 4 hours as needed for moderate pain 4/6/2017 Yes Reported, Patient

## 2017-04-07 ENCOUNTER — APPOINTMENT (OUTPATIENT)
Dept: CARDIOLOGY | Facility: CLINIC | Age: 47
End: 2017-04-07
Attending: NURSE PRACTITIONER
Payer: COMMERCIAL

## 2017-04-07 VITALS
SYSTOLIC BLOOD PRESSURE: 103 MMHG | RESPIRATION RATE: 18 BRPM | BODY MASS INDEX: 25.53 KG/M2 | DIASTOLIC BLOOD PRESSURE: 73 MMHG | OXYGEN SATURATION: 98 % | HEART RATE: 67 BPM | TEMPERATURE: 98.2 F | WEIGHT: 163 LBS

## 2017-04-07 LAB
CHOLEST SERPL-MCNC: 214 MG/DL
HDLC SERPL-MCNC: 47 MG/DL
INTERPRETATION ECG - MUSE: NORMAL
LDLC SERPL CALC-MCNC: 150 MG/DL
NONHDLC SERPL-MCNC: 167 MG/DL
TRIGL SERPL-MCNC: 83 MG/DL

## 2017-04-07 PROCEDURE — 93321 DOPPLER ECHO F-UP/LMTD STD: CPT | Mod: 26 | Performed by: INTERNAL MEDICINE

## 2017-04-07 PROCEDURE — 25000132 ZZH RX MED GY IP 250 OP 250 PS 637: Performed by: NURSE PRACTITIONER

## 2017-04-07 PROCEDURE — 25500064 ZZH RX 255 OP 636: Performed by: INTERNAL MEDICINE

## 2017-04-07 PROCEDURE — 25000132 ZZH RX MED GY IP 250 OP 250 PS 637: Performed by: PHYSICIAN ASSISTANT

## 2017-04-07 PROCEDURE — 40000264 ECHO STRESS TEST WITH DEFINITY

## 2017-04-07 PROCEDURE — 36415 COLL VENOUS BLD VENIPUNCTURE: CPT | Performed by: PHYSICIAN ASSISTANT

## 2017-04-07 PROCEDURE — 93016 CV STRESS TEST SUPVJ ONLY: CPT | Performed by: INTERNAL MEDICINE

## 2017-04-07 PROCEDURE — 25000128 H RX IP 250 OP 636: Performed by: NURSE PRACTITIONER

## 2017-04-07 PROCEDURE — 93325 DOPPLER ECHO COLOR FLOW MAPG: CPT | Mod: 26 | Performed by: INTERNAL MEDICINE

## 2017-04-07 PROCEDURE — 25000125 ZZHC RX 250: Performed by: PHYSICIAN ASSISTANT

## 2017-04-07 PROCEDURE — 80061 LIPID PANEL: CPT | Performed by: PHYSICIAN ASSISTANT

## 2017-04-07 PROCEDURE — 93018 CV STRESS TEST I&R ONLY: CPT | Performed by: INTERNAL MEDICINE

## 2017-04-07 PROCEDURE — 99217 ZZC OBSERVATION CARE DISCHARGE: CPT | Mod: Z6 | Performed by: EMERGENCY MEDICINE

## 2017-04-07 PROCEDURE — 93350 STRESS TTE ONLY: CPT | Mod: 26 | Performed by: INTERNAL MEDICINE

## 2017-04-07 PROCEDURE — 96374 THER/PROPH/DIAG INJ IV PUSH: CPT

## 2017-04-07 PROCEDURE — G0378 HOSPITAL OBSERVATION PER HR: HCPCS

## 2017-04-07 RX ORDER — KETOROLAC TROMETHAMINE 30 MG/ML
30 INJECTION, SOLUTION INTRAMUSCULAR; INTRAVENOUS ONCE
Status: COMPLETED | OUTPATIENT
Start: 2017-04-07 | End: 2017-04-07

## 2017-04-07 RX ORDER — TRAMADOL HYDROCHLORIDE 50 MG/1
50 TABLET ORAL EVERY 6 HOURS PRN
Qty: 12 TABLET | Refills: 0 | Status: SHIPPED | OUTPATIENT
Start: 2017-04-07 | End: 2018-06-30

## 2017-04-07 RX ADMIN — PERFLUTREN 5 ML: 6.52 INJECTION, SUSPENSION INTRAVENOUS at 08:59

## 2017-04-07 RX ADMIN — LIDOCAINE HYDROCHLORIDE 30 ML: 20 SOLUTION ORAL; TOPICAL at 01:43

## 2017-04-07 RX ADMIN — KETOROLAC TROMETHAMINE 30 MG: 30 INJECTION, SOLUTION INTRAMUSCULAR at 12:07

## 2017-04-07 RX ADMIN — ASPIRIN 81 MG: 81 TABLET, COATED ORAL at 08:31

## 2017-04-07 ASSESSMENT — PAIN DESCRIPTION - DESCRIPTORS: DESCRIPTORS: TENDER

## 2017-04-07 NOTE — DISCHARGE SUMMARY
Discharge Summary    Jayce Myers MRN# [id number]   YOB: 1970 Age: 47 year old     Date of Admission:  4/6/2017  Date of Discharge:  4/7/2017  6:19 PM  Admitting Physician:  Axel Camarena MD  Discharge Physician:  Carmelo Andrew MD  Discharging Service:  Emergency Medicine     Primary Provider: Chapo Maldonado          Discharge Diagnosis:   Chest Pain             Discharge Disposition:   Discharged to home           Condition on Discharge:   Discharge condition: Stable   Code status on discharge: Full Code           Procedures:   EKG  CXR  Exercise Stress Echocardiogram          Discharge Medications:     Current Discharge Medication List      CONTINUE these medications which have NOT CHANGED    Details   loratadine (CLARITIN) 10 MG tablet Take 10 mg by mouth daily      IBUPROFEN PO Take 400 mg by mouth every 4 hours as needed for moderate pain                   Consultations:   No consultations were requested during this admission             Brief History of Illness:   Please see detailed H&P from 04/06/2017, in brief: Jayce Myers is a 47 year old male w/ no significant PMH who presented to the ED with chest pain. Initial ED cardiac work-up negative.          Hospital Course:   Vital signs remained stable. No ectopy noted while on cardiac monitoring. Left sided chest pain relief after IV Toradol.  Serial Troponin x 3 negative; Chest x-ray negative for acute pulmonary etiology.  Borderline lipid profile: Cholesterol 214; Triglycerides 83; HDL 47; ; Non-  CBC within normal limits with WBC 5.7 & Hgb 15.6; BMP within normal limits with Potassium 3.5 & Sodium 141; Magnesium 2.3; Phosphorus 3.6.  Exercise stress echo showed no stress induced regional wall motion abnormalities with LV EF 55-60%.  - Prescription for Tramadol prn for chest wall discomfort.  - Warm packs QID to affected area.  - Modify diet and lifestyle modification.  - Increase daily physical exercise in  moderation  - Follow up with PCP in 7 days.            Final Day of Progress before Discharge:       Physical Exam:  Blood pressure 111/75, pulse 67, temperature 97.8  F (36.6  C), temperature source Oral, resp. rate 14, weight 73.9 kg (163 lb), SpO2 100 %.    EXAM:  GENERAL: Alert and oriented x 3. NAD.    HEENT: Anicteric sclera. PERRL. Mucous membranes moist and without lesions.    CV: RRR. S1, S2. No murmurs appreciated.    RESPIRATORY: Effort normal. Lungs CTAB with no wheezing, rales, rhonchi.    GI: Abdomen soft and non distended with normoactive bowel sounds present in all quadrants. No tenderness, rebound, guarding.    MUSCULOSKELETAL: Tenderness along left anterior chest wall/breast bone area. No joint swelling.    Moves all extremities.    NEUROLOGICAL: No focal deficits. Strength 5/5 bilaterally in upper and lower extremities.    EXTREMITIES: No peripheral edema. Intact bilateral pedal pulses.    SKIN: No jaundice. No rashes         Data:  All laboratory data reviewed             Significant Results:   None  Results for orders placed or performed during the hospital encounter of 04/06/17   XR Chest 2 Views    Narrative    XR CHEST 2 VW 4/6/2017 2:51 PM     HISTORY: chest pain      Impression    IMPRESSION: Negative exam.    MARGARIAT BARNEY MD   CBC with platelets differential   Result Value Ref Range    WBC 5.7 4.0 - 11.0 10e9/L    RBC Count 5.14 4.4 - 5.9 10e12/L    Hemoglobin 15.6 13.3 - 17.7 g/dL    Hematocrit 46.0 40.0 - 53.0 %    MCV 90 78 - 100 fl    MCH 30.4 26.5 - 33.0 pg    MCHC 33.9 31.5 - 36.5 g/dL    RDW 13.4 10.0 - 15.0 %    Platelet Count 256 150 - 450 10e9/L    Diff Method Automated Method     % Neutrophils 52.4 %    % Lymphocytes 38.2 %    % Monocytes 4.6 %    % Eosinophils 4.4 %    % Basophils 0.2 %    % Immature Granulocytes 0.2 %    Nucleated RBCs 0 0 /100    Absolute Neutrophil 3.0 1.6 - 8.3 10e9/L    Absolute Lymphocytes 2.2 0.8 - 5.3 10e9/L    Absolute Monocytes 0.3 0.0 - 1.3 10e9/L     Absolute Eosinophils 0.3 0.0 - 0.7 10e9/L    Absolute Basophils 0.0 0.0 - 0.2 10e9/L    Abs Immature Granulocytes 0.0 0 - 0.4 10e9/L    Absolute Nucleated RBC 0.0    Basic metabolic panel   Result Value Ref Range    Sodium 141 133 - 144 mmol/L    Potassium 3.5 3.4 - 5.3 mmol/L    Chloride 106 94 - 109 mmol/L    Carbon Dioxide 25 20 - 32 mmol/L    Anion Gap 10 3 - 14 mmol/L    Glucose 146 (H) 70 - 99 mg/dL    Urea Nitrogen 18 7 - 30 mg/dL    Creatinine 0.98 0.66 - 1.25 mg/dL    GFR Estimate 82 >60 mL/min/1.7m2    GFR Estimate If Black >90   GFR Calc   >60 mL/min/1.7m2    Calcium 8.8 8.5 - 10.1 mg/dL   Troponin I   Result Value Ref Range    Troponin I ES  0.000 - 0.045 ug/L     <0.015  The 99th percentile for upper reference range is 0.045 ug/L.  Troponin values in   the range of 0.045 - 0.120 ug/L may be associated with risks of adverse   clinical events.     Troponin I   Result Value Ref Range    Troponin I ES  0.000 - 0.045 ug/L     <0.015  The 99th percentile for upper reference range is 0.045 ug/L.  Troponin values in   the range of 0.045 - 0.120 ug/L may be associated with risks of adverse   clinical events.     Troponin I - Now then in 4 hours x 2    Result Value Ref Range    Troponin I ES  0.000 - 0.045 ug/L     <0.015  The 99th percentile for upper reference range is 0.045 ug/L.  Troponin values in   the range of 0.045 - 0.120 ug/L may be associated with risks of adverse   clinical events.     Hepatic panel   Result Value Ref Range    Bilirubin Direct <0.1 0.0 - 0.2 mg/dL    Bilirubin Total 0.3 0.2 - 1.3 mg/dL    Albumin 3.4 3.4 - 5.0 g/dL    Protein Total 6.9 6.8 - 8.8 g/dL    Alkaline Phosphatase 73 40 - 150 U/L    ALT 52 0 - 70 U/L    AST 19 0 - 45 U/L   Magnesium   Result Value Ref Range    Magnesium 2.3 1.6 - 2.3 mg/dL   Phosphorus   Result Value Ref Range    Phosphorus 3.6 2.5 - 4.5 mg/dL   Lipid profile   Result Value Ref Range    Cholesterol 214 (H) <200 mg/dL    Triglycerides 83  <150 mg/dL    HDL Cholesterol 47 >39 mg/dL    LDL Cholesterol Calculated 150 (H) <100 mg/dL    Non HDL Cholesterol 167 (H) <130 mg/dL   EKG 12 lead   Result Value Ref Range    Interpretation ECG Click View Image link to view waveform and result    Troponin POCT   Result Value Ref Range    Troponin I 0.00 0.00 - 0.10 ug/L   Troponin POCT   Result Value Ref Range    Troponin I 0.00 0.00 - 0.10 ug/L      Recent Results (from the past 48 hour(s))   XR Chest 2 Views    Narrative    XR CHEST 2 VW 4/6/2017 2:51 PM     HISTORY: chest pain      Impression    IMPRESSION: Negative exam.    MARGARITA BARNEY MD                Pending Results:   Unresulted Labs Ordered in the Past 30 Days of this Admission     No orders found for last 61 day(s).                  Discharge Instructions and Follow-Up:     Discharge Procedure Orders  Adult [facility] Follow-up and recommended labs and tests   Order Comments: Follow up with primary care provider, Chapo Maldonado, within 7 days for hospital follow- up.  Follow up on lipid panel.    Appointments on [place] and/or [facility] (with [facility] or [facility] provider or service). Call [phone] if you haven't heard regarding these appointments within 7 days of discharge.     Activity   Order Comments: Your activity upon discharge: activity as tolerated   Order Specific Question Answer Comments   Is discharge order? Yes      When to contact your care team   Order Comments: Return to the emergency department for chest pain, shortness of breath, fever, worsening symptoms or concerns     Reason for your hospital stay   Order Comments: Casandra Louis, you were evaluated for chest pain.  Your heart muscle blood work was negative. Your vital signs remained stable.  Your electrolytes, hemoglobin, white blood cell count were all within normal limits.  Your lipid (cholesterol) lab work was borderline high. Follow up with your primary care provider in 1 week.  You have been given Tramadol for your chest  wall pain. Take as directed. Do not drive, drink alcohol or operate machinery while taking this medication. It can make you drowsy.  As discussed, modify your diet- increase fresh fruits and vegetables, avoid high fat, processed foods. Increase your water intake. Avoid caffeine. Increase your exercise regimen as tolerated.  Warm packs four times daily to affected area (15 minutes at a time) while awake.  Return to the emergency department for worsening chest pain, shortness of breath, worsening symptoms or concerns.     Full Code     Diet   Order Comments: Follow this diet upon discharge: Orders Placed This Encounter     Low Saturated Fat Na <2400 mg   Order Specific Question Answer Comments   Is discharge order? Yes           Discussed case with Dr. Andrew.    Attestation: I have reviewed all labs, vital signs, imaging and nurses notes.      EDI Rodgers, CNP  ED Observation Unit  [facility]

## 2017-04-07 NOTE — PROGRESS NOTES
8:58 AM  Patient seen and examined, electronic medical record reviewed.  Plan discussed with DEBBIE.  Subjective: Patient has had no recurrence of upper chest pain or shoulder or back pain.  Patient does note that his symptoms occurred in the setting of drinking caffeinated tea.  Patient otherwise denies current symptoms overnight.  Objective:  Vitals:    04/06/17 2155 04/06/17 2200 04/07/17 0400 04/07/17 0800   BP:  123/87     BP Location:   Left arm Left arm   Pulse:   66 67   Resp:   18 18   Temp:   97.4  F (36.3  C) 97.8  F (36.6  C)   TempSrc:   Oral Oral   SpO2: 95%   97%   Weight:         Chest is clear to auscultation.  Cardiovascular exam regular rate and rhythm.  Abdomen soft and nontender.  Results for orders placed or performed during the hospital encounter of 04/06/17   XR Chest 2 Views    Narrative    XR CHEST 2 VW 4/6/2017 2:51 PM     HISTORY: chest pain      Impression    IMPRESSION: Negative exam.    MARGARITA BARNEY MD   CBC with platelets differential   Result Value Ref Range    WBC 5.7 4.0 - 11.0 10e9/L    RBC Count 5.14 4.4 - 5.9 10e12/L    Hemoglobin 15.6 13.3 - 17.7 g/dL    Hematocrit 46.0 40.0 - 53.0 %    MCV 90 78 - 100 fl    MCH 30.4 26.5 - 33.0 pg    MCHC 33.9 31.5 - 36.5 g/dL    RDW 13.4 10.0 - 15.0 %    Platelet Count 256 150 - 450 10e9/L    Diff Method Automated Method     % Neutrophils 52.4 %    % Lymphocytes 38.2 %    % Monocytes 4.6 %    % Eosinophils 4.4 %    % Basophils 0.2 %    % Immature Granulocytes 0.2 %    Nucleated RBCs 0 0 /100    Absolute Neutrophil 3.0 1.6 - 8.3 10e9/L    Absolute Lymphocytes 2.2 0.8 - 5.3 10e9/L    Absolute Monocytes 0.3 0.0 - 1.3 10e9/L    Absolute Eosinophils 0.3 0.0 - 0.7 10e9/L    Absolute Basophils 0.0 0.0 - 0.2 10e9/L    Abs Immature Granulocytes 0.0 0 - 0.4 10e9/L    Absolute Nucleated RBC 0.0    Basic metabolic panel   Result Value Ref Range    Sodium 141 133 - 144 mmol/L    Potassium 3.5 3.4 - 5.3 mmol/L    Chloride 106 94 - 109 mmol/L    Carbon  Dioxide 25 20 - 32 mmol/L    Anion Gap 10 3 - 14 mmol/L    Glucose 146 (H) 70 - 99 mg/dL    Urea Nitrogen 18 7 - 30 mg/dL    Creatinine 0.98 0.66 - 1.25 mg/dL    GFR Estimate 82 >60 mL/min/1.7m2    GFR Estimate If Black >90   GFR Calc   >60 mL/min/1.7m2    Calcium 8.8 8.5 - 10.1 mg/dL   Troponin I   Result Value Ref Range    Troponin I ES  0.000 - 0.045 ug/L     <0.015  The 99th percentile for upper reference range is 0.045 ug/L.  Troponin values in   the range of 0.045 - 0.120 ug/L may be associated with risks of adverse   clinical events.     Troponin I   Result Value Ref Range    Troponin I ES  0.000 - 0.045 ug/L     <0.015  The 99th percentile for upper reference range is 0.045 ug/L.  Troponin values in   the range of 0.045 - 0.120 ug/L may be associated with risks of adverse   clinical events.     Troponin I - Now then in 4 hours x 2    Result Value Ref Range    Troponin I ES  0.000 - 0.045 ug/L     <0.015  The 99th percentile for upper reference range is 0.045 ug/L.  Troponin values in   the range of 0.045 - 0.120 ug/L may be associated with risks of adverse   clinical events.     Hepatic panel   Result Value Ref Range    Bilirubin Direct <0.1 0.0 - 0.2 mg/dL    Bilirubin Total 0.3 0.2 - 1.3 mg/dL    Albumin 3.4 3.4 - 5.0 g/dL    Protein Total 6.9 6.8 - 8.8 g/dL    Alkaline Phosphatase 73 40 - 150 U/L    ALT 52 0 - 70 U/L    AST 19 0 - 45 U/L   Magnesium   Result Value Ref Range    Magnesium 2.3 1.6 - 2.3 mg/dL   Phosphorus   Result Value Ref Range    Phosphorus 3.6 2.5 - 4.5 mg/dL   Lipid profile   Result Value Ref Range    Cholesterol 214 (H) <200 mg/dL    Triglycerides 83 <150 mg/dL    HDL Cholesterol 47 >39 mg/dL    LDL Cholesterol Calculated 150 (H) <100 mg/dL    Non HDL Cholesterol 167 (H) <130 mg/dL   EKG 12 lead   Result Value Ref Range    Interpretation ECG Click View Image link to view waveform and result    Troponin POCT   Result Value Ref Range    Troponin I 0.00 0.00 - 0.10 ug/L    Troponin POCT   Result Value Ref Range    Troponin I 0.00 0.00 - 0.10 ug/L     Assessment/plan: 47-year-old male with a history of mildly elevated lipids in the past who presents for evaluation of chest pain.  Serial troponins as well as ECG are unremarkable.  Currently awaiting stress echo.

## 2017-04-07 NOTE — PLAN OF CARE
Problem: Discharge Planning  Goal: Discharge Planning (Adult, OB, Behavioral, Peds)  Outpatient/Observation goals to be met before discharge home:  Outpatient/Observation goals to be met before discharge home:  - Serial troponins and stress test complete: Troponin negative x 2, stress test this morning   - Seen and cleared by consultant if applicable: No   - Adequate pain control on oral analgesia: Monitoring   - Vital signs normal or at patient baseline : Monitoring  - Safe disposition plan has been identified: Pending

## 2017-04-07 NOTE — PLAN OF CARE
Problem: Discharge Planning  Goal: Discharge Planning (Adult, OB, Behavioral, Peds)  Outpatient/Observation goals to be met before discharge home:  - Serial troponins and stress test complete: Troponin negative x 2, stress test this morning   - Seen and cleared by consultant if applicable: No   - Adequate pain control on oral analgesia: Monitoring   - Vital signs normal or at patient baseline : Monitoring  - Safe disposition plan has been identified: Pending

## 2017-04-07 NOTE — ED NOTES
Pt signed off to me at 4 pm. The room did not become open till 9 pm. The pt had no cp, vs remained stable and repeat trop was zero.     Ashvin Pena MD  04/06/17 2126

## 2017-04-07 NOTE — PLAN OF CARE
Problem: Discharge Planning  Goal: Discharge Planning (Adult, OB, Behavioral, Peds)  Outpatient/Observation goals to be met before discharge home:  - Serial troponins and stress test complete: Troponin negative x 2, stress test in the morning   - Seen and cleared by consultant if applicable: No   - Adequate pain control on oral analgesia: Monitoring   - Vital signs normal or at patient baseline : Monitoring  - Safe disposition plan has been identified: Pending

## 2017-04-07 NOTE — PLAN OF CARE
Problem: Discharge Planning  Goal: Discharge Planning (Adult, OB, Behavioral, Peds)  Outpatient/Observation goals to be met before discharge home:     - Serial troponins and stress test complete: YES, Troponin negative x 3, stress test completed  - Seen and cleared by consultant if applicable: YES  - Adequate pain control on oral analgesia: YES, patient given once does of IV Toradol for complaint of chest tenderness; pt reports relief.   - Vital signs normal or at patient baseline : YES, will continue monitoring  - Safe disposition plan has been identified: YES

## 2017-04-07 NOTE — PLAN OF CARE
Problem: Discharge Planning  Goal: Discharge Planning (Adult, OB, Behavioral, Peds)  Outpatient/Observation goals to be met before discharge home:     - Serial troponins and stress test complete: NO, Troponin negative x 3, stress test this morning   - Seen and cleared by consultant if applicable: NO  - Adequate pain control on oral analgesia: YES, patient has had no complaint of pain, will continue monitoring   - Vital signs normal or at patient baseline : YES, will continue monitoring  - Safe disposition plan has been identified: NO, Pending

## 2017-04-07 NOTE — PROGRESS NOTES
Discharge instruction reviewed.  Patient verbalized understanding. PIV removed, patient ambulated to the main lobby. Family awaiting at main lobby. Patient discharged with all belongings and paper Rx for Tramadol.

## 2017-04-10 ENCOUNTER — TELEPHONE (OUTPATIENT)
Dept: FAMILY MEDICINE | Facility: CLINIC | Age: 47
End: 2017-04-10

## 2017-04-10 NOTE — TELEPHONE ENCOUNTER
"  ED for acute condition Discharge Protocol    \"Hi, my name is Chantelle MATILDA Victoria, a registered nurse, and I am calling from [facility].  I am calling to follow up and see how things are going for you after your recent emergency visit.\"    Tell me how you are doing now that you are home?\" Doing ok. Is taking tramadol and alternating with ibuprofen for the pain. Still has some pain, but it is manageable.       Discharge Instructions    \"Let's review your discharge instructions.  What is/are the follow-up recommendations?  Pt. Response: Pt to follow up with pcp at the end of the week and to take tramadol as needed for pain    \"Has an appointment with your primary care provider been scheduled?\"  Yes. (confirm and remind to bring meds)    Medications    \"Tell me what changed about your medicines when you discharged?\"    Tramadol was added to take prn for pain    \"What questions do you have about your medications?\"   None        Call Summary    \"What questions or concerns do you have about your recent visit and your follow-up care?\"      . Advice given:     Costochondritis  Costochondritis is inflammation of a rib or the cartilage that connects a rib to your breastbone (sternum). It causes tenderness, and sometimes chest pain may be sharp or aching, or it may feel like pressure. Pain may get worse with deep breathing, movement, or exercise. In some cases, the pain is mistaken for a heart attack. Despite this, the condition is not serious. Read on to learn more about the condition and how it can be treated.    What causes costochondritis?  The cause of costochondritis is not completely clear, but it may happen after a chest injury, chest infection or coughing episode. Some physical activities can sometimes lead to costochondritis. Large-breasted women may be more likely to have the condition. Often, the reason for the inflammation is unknown.  Diagnosing costochondritis  There is no test for costochrondritis. The " "condition is diagnosed by the symptoms you have. In some cases, tests are done to rule out more serious problems. These tests may include imaging tests such as chest X-ray or CT scan.  Treating costochondritis  If an underlying cause is found, treatment for that will likely relieve the problem. Costochondritis often goes away on its own. The course of the condition varies from person to person. It usually lasts from weeks to months. In some cases, mild symptoms continue for months to years. To ease symptoms:    Take medications as directed. These relieve pain and swelling. Ibuprofen or other NSAIDs are often recommended. In some cases, you may be given prescription medication, such as muscle relaxants.    Avoid activities that put stress on the chest or spine.    Apply a heating pad (set to warm, not to high, heat) to the breastbone several times a day.    Perform stretching exercises as directed  Call the health care provider right away if you have any of the following:    Pain that is not relieved by medication    Shortness of breath    Lightheadedness, dizziness, or fainting    Feeling of irregular heartbeat or fast pulse  Anyone with chest pain should see a doctor, especially those who are older and may be at risk for heart disease.     9750-9274 [employer]. [address] [place] [address]. All rights reserved. This information is not intended as a substitute for professional medical care. Always follow your healthcare professional's instructions.      \"If you have questions or things don't continue to improve, we encourage you contact us through the main clinic number (give number).  Even if the clinic is not open, triage nurses are available 24/7 to help you.     We would like you to know that our clinic has extended hours (provide information).  We also have urgent care (provide details on closest location and hours/contact info)\"    \"Thank you for your time and take " "care!\"              "

## 2017-04-13 NOTE — PROGRESS NOTES
"  SUBJECTIVE:                                                    Jayce Myers is a 47 year old male who presents to clinic today for the following health issues:    Hospital Follow-up Visit:    Hospital/Nursing Home/IP Rehab Facility: [facility]  Date of Admission: 4/6/2017  Date of Discharge: 4/7/2017  Reason(s) for Admission: Chest pain             Problems taking medications regularly:  None       Medication changes since discharge: None       Problems adhering to non-medication therapy:  None    Summary of hospitalization:  [facility] discharge summary reviewed  Diagnostic Tests/Treatments reviewed.  Follow up needed: none  Other Healthcare Providers Involved in Patient s Care:         None  Update since discharge: improved.     Post Discharge Medication Reconciliation: discharge medications reconciled and changed, per note/orders (see AVS).  Plan of care communicated with patient     Coding guidelines for this visit:  Type of Medical   Decision Making Face-to-Face Visit       within 7 Days of discharge Face-to-Face Visit        within 14 days of discharge   Moderate Complexity [id number]   High Complexity [id number]          Patient says that on 4/6/17 he was having problems with chest pain, and he says it felt like there were \"bands of pain\" across his left chest. He decided to wait it out, but the pain become constant after several hours and he decided to go to the hospital. No problems with shortness of breath or lightheadedness. He says that he had multiple troponin labs completed and a stress echo, and it was determined that he did not have any problems with his heart. Patient was diagnosed with costochondritis, and he says that the diagnosis makes sense to him and thinks that for several weeks prior he had felt tender in his chest. There has been some improvement in his pain since his hospital visit, but says that he has intermittent \"pings\" of pain. He says that he has " stopped drinking coffee several months ago and started drinking green tea instead, and he says that he had more caffeine than usual the weak he had the pain, and wonders if the caffeine might be causing pain. Patient has been taking tramadol as needed for the pain, and says that it has been helpful.    Problem list and histories reviewed & adjusted, as indicated.  Additional history: as documented    Patient Active Problem List   Diagnosis     Nevus     Vestibular neuronitis     Plantar fasciitis     Costochondral chest pain     Abdominal pain     Hyperlipidemia with target LDL less than 130     Epiploic appendagitis     Pain of finger of left hand     Costochondritis, acute     History reviewed. No pertinent surgical history.    Social History   Substance Use Topics     Smoking status: Former Smoker     Packs/day: 0.50     Years: 3.00     Types: Cigarettes     Quit date: 1/26/2003     Smokeless tobacco: Never Used      Comment: social smoking     Alcohol use No     Family History   Problem Relation Age of Onset     CANCER Mother      salivary gland?     Thyroid Disease Mother      DIABETES Mother      DM2     Anemia Sister      ОЛЬГА, Jayce does not known cause     Cancer - colorectal No family hx of          Current Outpatient Prescriptions   Medication Sig Dispense Refill     traMADol (ULTRAM) 50 MG tablet Take 1 tablet (50 mg) by mouth every 6 hours as needed for breakthrough pain maximum 3 tablet(s) per day 12 tablet 0     loratadine (CLARITIN) 10 MG tablet Take 10 mg by mouth daily       IBUPROFEN PO Take 400 mg by mouth every 4 hours as needed for moderate pain       Allergies   Allergen Reactions     Pollen Extract Itching     Hayfever       Reviewed and updated as needed this visit by clinical staff  Tobacco  Allergies  Meds  Med Hx  Surg Hx  Fam Hx  Soc Hx      Reviewed and updated as needed this visit by Provider         ROS:  Positive for muscular chest pain.    Denies headache, insomnia, shortness  of breath, cough, heartburn, bowel issues, bladder issues, neck pain, back pain, hip pain, knee pain, ankle pain, or foot pain. Remainder of ROS is negative unless otherwise noted above or in HPI.    This document serves as a record of the services and decisions personally performed and made by Chapo Maldonado MD. It was created on his behalf by Alexis Baptiste, a trained medical scribe. The creation of this document is based the provider's statements to the medical scribe.  Alexis Baptiste 4:58 PM April 14, 2017    OBJECTIVE:                                                    /62  Pulse 77  Temp 98  F (36.7  C) (Oral)  Wt 75.8 kg (167 lb 3.2 oz)  SpO2 97%  BMI 26.19 kg/m2  Body mass index is 26.19 kg/(m^2).  GENERAL: healthy, alert and no distress  RESP: lungs clear to auscultation - no rales, rhonchi or wheezes  CV: regular rate and rhythm, normal S1 S2, no S3 or S4, no murmur, click or rub, no peripheral edema and peripheral pulses strong  MS: nontender in anterior axillary line, tender along costochondral junction on left along breastbone  SKIN: no suspicious lesions or rashes  NEURO: Normal strength and tone, mentation intact and speech normal  PSYCH: mentation appears normal, affect normal/bright    Diagnostic Test Results:  No results found for this or any previous visit (from the past 24 hour(s)).     ASSESSMENT/PLAN:                                                      (M94.0) Costochondritis, acute  (primary encounter diagnosis)  Comment: Advised hot and cold packs, activity, and ibuprofen as needed.  Plan: Will continue to monitor. Follow up as needed.    Patient Instructions   The 10-year ASCVD risk score (Norris ARYA Jr, et al., 2013) is: 2.6%    Values used to calculate the score:      Age: 47 years      Sex: Male      Is Non- : No      Diabetic: No      Tobacco smoker: No      Systolic Blood Pressure: 110 mmHg      Is BP treated: No      HDL Cholesterol: 47  mg/dL      Total Cholesterol: 214 mg/dL     -You may use ice or warm packs as needed for pain, and take ibuprofen as needed.  -Try to stay active, as that may help with the pain.    The information in this document, created by the medical scribe for me, accurately reflects the services I personally performed and the decisions made by me. I have reviewed and approved this document for accuracy prior to leaving the patient care area.   Chapo Maldonado MD 4:58 PM April 14, 2017  [facility]

## 2017-04-14 ENCOUNTER — OFFICE VISIT (OUTPATIENT)
Dept: FAMILY MEDICINE | Facility: CLINIC | Age: 47
End: 2017-04-14
Payer: COMMERCIAL

## 2017-04-14 VITALS
TEMPERATURE: 98 F | DIASTOLIC BLOOD PRESSURE: 62 MMHG | OXYGEN SATURATION: 97 % | WEIGHT: 167.2 LBS | HEART RATE: 77 BPM | SYSTOLIC BLOOD PRESSURE: 110 MMHG | BODY MASS INDEX: 26.19 KG/M2

## 2017-04-14 DIAGNOSIS — M94.0 COSTOCHONDRITIS, ACUTE: Primary | ICD-10-CM

## 2017-04-14 PROCEDURE — 99496 TRANSJ CARE MGMT HIGH F2F 7D: CPT | Performed by: FAMILY MEDICINE

## 2017-04-14 NOTE — PATIENT INSTRUCTIONS
The 10-year ASCVD risk score (Becky KOENIG Jr et al., 2013) is: 2.6%    Values used to calculate the score:      Age: 47 years      Sex: Male      Is Non- : No      Diabetic: No      Tobacco smoker: No      Systolic Blood Pressure: 110 mmHg      Is BP treated: No      HDL Cholesterol: 47 mg/dL      Total Cholesterol: 214 mg/dL     -You may use ice or warm packs as needed for pain, and take ibuprofen as needed.  -Try to stay active, as that may help with the pain.

## 2017-04-14 NOTE — MR AVS SNAPSHOT
After Visit Summary   4/14/2017    Jayce Myers    MRN: [id number]           Patient Information     Date Of Birth          1970        Visit Information        Provider Department      4/14/2017 4:30 PM Chapo Maldonado MD [facility]        Today's Diagnoses     Costochondritis, acute    -  1      Care Instructions    The 10-year ASCVD risk score (Beckyesmer KOENIG Jr, et al., 2013) is: 2.6%    Values used to calculate the score:      Age: 47 years      Sex: Male      Is Non- : No      Diabetic: No      Tobacco smoker: No      Systolic Blood Pressure: 110 mmHg      Is BP treated: No      HDL Cholesterol: 47 mg/dL      Total Cholesterol: 214 mg/dL     -You may use ice or warm packs as needed for pain, and take ibuprofen as needed.  -Try to stay active, as that may help with the pain.        Follow-ups after your visit        Who to contact     If you have questions or need follow up information about today's clinic visit or your schedule please contact [facility] directly at [phone].  Normal or non-critical lab and imaging results will be communicated to you by MyChart, letter or phone within 4 business days after the clinic has received the results. If you do not hear from us within 7 days, please contact the clinic through [employer] or phone. If you have a critical or abnormal lab result, we will notify you by phone as soon as possible.  Submit refill requests through [employer] or call your pharmacy and they will forward the refill request to us. Please allow 3 business days for your refill to be completed.          Additional Information About Your Visit        MyChart Information     [employer] gives you secure access to your electronic health record. If you see a primary care provider, you can also send messages to your care team and make appointments. If you have questions, please call your primary care clinic.  If you do not have a primary  care provider, please call [phone] and they will assist you.        Care EveryWhere ID     This is your Care EveryWhere ID. This could be used by other organizations to access your [place] medical records  [id number]        Your Vitals Were     Pulse Temperature Pulse Oximetry BMI (Body Mass Index)          77 98  F (36.7  C) (Oral) 97% 26.19 kg/m2         Blood Pressure from Last 3 Encounters:   04/14/17 110/62   04/07/17 103/73   02/21/17 (!) 138/106    Weight from Last 3 Encounters:   04/14/17 167 lb 3.2 oz (75.8 kg)   04/06/17 163 lb (73.9 kg)   02/21/17 169 lb (76.7 kg)              Today, you had the following     No orders found for display       Primary Care Provider Office Phone # Fax #    Chapo Maldonado [phone]       [facility] [address] [facility] [address]  [facility] [address]        Thank you!     Thank you for choosing [facility]  for your care. Our goal is always to provide you with excellent care. Hearing back from our patients is one way we can continue to improve our services. Please take a few minutes to complete the written survey that you may receive in the mail after your visit with us. Thank you!             Your Updated Medication List - Protect others around you: Learn how to safely use, store and throw away your medicines at www.disposemymeds.org.          This list is accurate as of: 4/14/17  5:39 PM.  Always use your most recent med list.                   Brand Name Dispense Instructions for use    IBUPROFEN PO      Take 400 mg by mouth every 4 hours as needed for moderate pain       loratadine 10 MG tablet    CLARITIN     Take 10 mg by mouth daily       traMADol 50 MG tablet    ULTRAM    12 tablet    Take 1 tablet (50 mg) by mouth every 6 hours as needed for breakthrough pain maximum 3 tablet(s) per day

## 2017-08-11 ENCOUNTER — OFFICE VISIT (OUTPATIENT)
Dept: FAMILY MEDICINE | Facility: CLINIC | Age: 47
End: 2017-08-11
Payer: COMMERCIAL

## 2017-08-11 VITALS
BODY MASS INDEX: 26.67 KG/M2 | DIASTOLIC BLOOD PRESSURE: 77 MMHG | HEART RATE: 73 BPM | WEIGHT: 169.9 LBS | SYSTOLIC BLOOD PRESSURE: 125 MMHG | TEMPERATURE: 97.2 F | HEIGHT: 67 IN | OXYGEN SATURATION: 97 %

## 2017-08-11 DIAGNOSIS — H69.91 DYSFUNCTION OF EUSTACHIAN TUBE, RIGHT: Primary | ICD-10-CM

## 2017-08-11 PROCEDURE — 99213 OFFICE O/P EST LOW 20 MIN: CPT | Performed by: FAMILY MEDICINE

## 2017-08-11 NOTE — PATIENT INSTRUCTIONS
Positional Vertigo          What is positional vertigo?   Positional vertigo is an inner ear problem. It causes brief but sometimes severe feelings of spinning. Some people feel that their head or body is spinning. Others feel the room is spinning. People often say they are dizzy, but dizzy is a very general term. Vertigo, on the other hand, is the very specific feeling of uncontrollable spinning.   Symptoms of positional vertigo happen suddenly when you change the position of your head.   How does it occur?   In the inner part of your ear are 3 semicircular canals. Movement of the fluid in these canals helps your brain maintain your balance and know what position you are in (for example, standing up, lying down, or standing on your head).   Sometimes small crystals of calcium develop and float in the fluid in the inner ear. This can happen after a head injury, with a severe cold, or simply as a part of normal aging. The crystals can cause vertigo when you change head position and they strike against nerve endings in the semicircular canals. Usually the calcium crystals dissolve in a few weeks and stop causing vertigo. However, sometimes the crystals do not dissolve and the vertigo returns from time to time.   What are the symptoms?   A sudden feeling that you are spinning, or that the room is spinning, is the main symptom. You may feel the vertigo when you first wake up. It may seem that any turn of your head brings on brief but intense spells of vertigo. It may happen when you tilt your head, look up or down, or roll over in bed.   You may have nausea and vomiting along with the vertigo. Even if a spell of vertigo is brief, you may have a feeling of queasiness for several minutes or even hours afterward.   How is it diagnosed?   Your healthcare provider will ask about your symptoms and examine you. You may also be given a Driss-Hallpike position test.   You start the Salem-Hallpike test by sitting upright  on the examining table. Your healthcare provider slowly brings your head down over the edge of the table and turns your head to one side. If you have positional vertigo, your provider will see your eyes making fast, jerky movements called nystagmus. If no nystagmus is seen, your provider will repeat the test, this time turning your head to the opposite side, to test the other inner ear. If you then have nystagmus and vertigo, the ear that is pointing toward the floor is the one causing the problem. The nystagmus and vertigo will slow down and stop after 15 to 20 seconds. If you do not move your head, no more symptoms will occur. When you sit back up, you will have vertigo again, but for a shorter time.   Other tests you may have are:   an ear exam   an audiogram to check your hearing   a test of your nerve responses   an electronystagmogram (ENG) test.   How is it treated?   Mild vertigo is often treated with medicine. The most common medicine for this problem is meclizine. It is taken up to 4 times a day for the vertigo and nausea or vomiting. One of the problems with this medicine is that it causes drowsiness. This is not as much of a problem if you have severe vertigo, which usually requires bed rest. Then the medicine can help you sleep and get relief from the vertigo while you sleep.   Your healthcare provider may recommend techniques that use gravity to move the crystals away from the nerve endings into an area of the inner ear that won't cause any problems. These are called repositioning techniques.   One repositioning technique is the Epley maneuver. It can be very helpful. Your healthcare provider will move your head into 4 positions. You will hold each position for about 30 seconds.   Your healthcare provider may also suggest that you do Sidhu-Daroff exercises. Your provider may recommend that you do these exercises 3 times a day for 2 weeks. To do these exercises:   Start by sitting upright on your bed.    Lie on your left side, with your head angled upward about retirement. (Imagine that you are looking at the head of someone standing about 6 feet in front of you.) Stay in this position for 30 seconds, or, if you are having vertigo, until the vertigo stops.   Return to the sitting position for 30 seconds.   Lie on your right side, and follow the same routine.   Your healthcare provider may refer you to a physical therapist to learn and practice these repositioning techniques.   Rarely, when repositioning techniques don't help and the vertigo has not gone away after a few weeks, severe cases may eventually require surgery.   How long will the effects last?   Even without treatment, positional vertigo usually goes away within several weeks. Sometimes it recurs despite treatment.   How do I take care of myself?   If your vertigo is mild, you may be able to continue your usual activities, especially if you have opportunities to sit and rest when you have vertigo.   If your vertigo does not allow you to continue your usual routine, you should rest at home.   Use medicine as prescribed by your healthcare provider to help stop symptoms of dizziness, nausea, and vomiting.   Follow your instructions for using the repositioning techniques.   Do not try to drive, operate tools or machinery, or do other tasks, even cooking, that could endanger yourself or others if you suddenly become dizzy.   Follow your healthcare provider's recommendations for follow-up visits.   Contact your healthcare provider if:   Your symptoms seem to be getting worse, more frequent, or longer lasting.   You develop new symptoms, such as a loss of hearing or severe headache.     Published by [employer].  This content is reviewed periodically and is subject to change as new health information becomes available. The information is intended to inform and educate and is not a replacement for medical evaluation, advice, diagnosis or treatment by a healthcare  professional.   Developed by [employer].   ? 2010 [employer] and/or its affiliates. All Rights Reserved.   Copyright   [employer] 2011  Adult Health Advisor         -Try using a charly pot with saline solution in both nostrils. Continue to use the flonase.  -It may be a good idea to plug your nose and try to pressurize your ears to see if that helps. Try that at breakfast, lunch and dinner, and don't pressurize too dramatically.  -Let me know if things change or worsen, and I can get you a script for antibiotics.

## 2017-08-11 NOTE — NURSING NOTE
"Chief Complaint   Patient presents with     Sinus Problem       Initial /77  Pulse 73  Temp 97.2  F (36.2  C) (Oral)  Ht 5' 7\" (1.702 m)  Wt 169 lb 14.4 oz (77.1 kg)  SpO2 97%  BMI 26.61 kg/m2 Estimated body mass index is 26.61 kg/(m^2) as calculated from the following:    Height as of this encounter: 5' 7\" (1.702 m).    Weight as of this encounter: 169 lb 14.4 oz (77.1 kg).  Medication Reconciliation: complete   Jeny Motley MA      "

## 2017-08-11 NOTE — MR AVS SNAPSHOT
After Visit Summary   8/11/2017    Jayce Myers    MRN: [id number]           Patient Information     Date Of Birth          1970        Visit Information        Provider Department      8/11/2017 4:00 PM Chapo Maldonado MD [facility] [place]        Today's Diagnoses     Dysfunction of eustachian tube, right    -  1      Care Instructions                Positional Vertigo          What is positional vertigo?   Positional vertigo is an inner ear problem. It causes brief but sometimes severe feelings of spinning. Some people feel that their head or body is spinning. Others feel the room is spinning. People often say they are dizzy, but dizzy is a very general term. Vertigo, on the other hand, is the very specific feeling of uncontrollable spinning.   Symptoms of positional vertigo happen suddenly when you change the position of your head.   How does it occur?   In the inner part of your ear are 3 semicircular canals. Movement of the fluid in these canals helps your brain maintain your balance and know what position you are in (for example, standing up, lying down, or standing on your head).   Sometimes small crystals of calcium develop and float in the fluid in the inner ear. This can happen after a head injury, with a severe cold, or simply as a part of normal aging. The crystals can cause vertigo when you change head position and they strike against nerve endings in the semicircular canals. Usually the calcium crystals dissolve in a few weeks and stop causing vertigo. However, sometimes the crystals do not dissolve and the vertigo returns from time to time.   What are the symptoms?   A sudden feeling that you are spinning, or that the room is spinning, is the main symptom. You may feel the vertigo when you first wake up. It may seem that any turn of your head brings on brief but intense spells of vertigo. It may happen when you tilt your head, look up or down, or roll over in bed.    You may have nausea and vomiting along with the vertigo. Even if a spell of vertigo is brief, you may have a feeling of queasiness for several minutes or even hours afterward.   How is it diagnosed?   Your healthcare provider will ask about your symptoms and examine you. You may also be given a Unadilla-Hallpike position test.   You start the Unadilla-Hallpike test by sitting upright on the examining table. Your healthcare provider slowly brings your head down over the edge of the table and turns your head to one side. If you have positional vertigo, your provider will see your eyes making fast, jerky movements called nystagmus. If no nystagmus is seen, your provider will repeat the test, this time turning your head to the opposite side, to test the other inner ear. If you then have nystagmus and vertigo, the ear that is pointing toward the floor is the one causing the problem. The nystagmus and vertigo will slow down and stop after 15 to 20 seconds. If you do not move your head, no more symptoms will occur. When you sit back up, you will have vertigo again, but for a shorter time.   Other tests you may have are:   an ear exam   an audiogram to check your hearing   a test of your nerve responses   an electronystagmogram (ENG) test.   How is it treated?   Mild vertigo is often treated with medicine. The most common medicine for this problem is meclizine. It is taken up to 4 times a day for the vertigo and nausea or vomiting. One of the problems with this medicine is that it causes drowsiness. This is not as much of a problem if you have severe vertigo, which usually requires bed rest. Then the medicine can help you sleep and get relief from the vertigo while you sleep.   Your healthcare provider may recommend techniques that use gravity to move the crystals away from the nerve endings into an area of the inner ear that won't cause any problems. These are called repositioning techniques.   One repositioning technique is the  Epley maneuver. It can be very helpful. Your healthcare provider will move your head into 4 positions. You will hold each position for about 30 seconds.   Your healthcare provider may also suggest that you do Sidhu-Daroff exercises. Your provider may recommend that you do these exercises 3 times a day for 2 weeks. To do these exercises:   Start by sitting upright on your bed.   Lie on your left side, with your head angled upward about prison. (Imagine that you are looking at the head of someone standing about 6 feet in front of you.) Stay in this position for 30 seconds, or, if you are having vertigo, until the vertigo stops.   Return to the sitting position for 30 seconds.   Lie on your right side, and follow the same routine.   Your healthcare provider may refer you to a physical therapist to learn and practice these repositioning techniques.   Rarely, when repositioning techniques don't help and the vertigo has not gone away after a few weeks, severe cases may eventually require surgery.   How long will the effects last?   Even without treatment, positional vertigo usually goes away within several weeks. Sometimes it recurs despite treatment.   How do I take care of myself?   If your vertigo is mild, you may be able to continue your usual activities, especially if you have opportunities to sit and rest when you have vertigo.   If your vertigo does not allow you to continue your usual routine, you should rest at home.   Use medicine as prescribed by your healthcare provider to help stop symptoms of dizziness, nausea, and vomiting.   Follow your instructions for using the repositioning techniques.   Do not try to drive, operate tools or machinery, or do other tasks, even cooking, that could endanger yourself or others if you suddenly become dizzy.   Follow your healthcare provider's recommendations for follow-up visits.   Contact your healthcare provider if:   Your symptoms seem to be getting worse, more  frequent, or longer lasting.   You develop new symptoms, such as a loss of hearing or severe headache.     Published by [employer].  This content is reviewed periodically and is subject to change as new health information becomes available. The information is intended to inform and educate and is not a replacement for medical evaluation, advice, diagnosis or treatment by a healthcare professional.   Developed by [employer].   ? 2010 [facility] and/or its affiliates. All Rights Reserved.   Copyright   Clinical Reference Systems 2011  Adult Health Advisor         -Try using a charly pot with saline solution in both nostrils. Continue to use the flonase.  -It may be a good idea to plug your nose and try to pressurize your ears to see if that helps. Try that at breakfast, lunch and dinner, and don't pressurize too dramatically.  -Let me know if things change or worsen, and I can get you a script for antibiotics.          Follow-ups after your visit        Who to contact     If you have questions or need follow up information about today's clinic visit or your schedule please contact [facility] directly at [phone].  Normal or non-critical lab and imaging results will be communicated to you by [employer], letter or phone within 4 business days after the clinic has received the results. If you do not hear from us within 7 days, please contact the clinic through [employer] or phone. If you have a critical or abnormal lab result, we will notify you by phone as soon as possible.  Submit refill requests through [employer] or call your pharmacy and they will forward the refill request to us. Please allow 3 business days for your refill to be completed.          Additional Information About Your Visit        [employer] Information     [employer] gives you secure access to your electronic health record. If you see a primary care provider, you can also send messages to your care team and make appointments. If you have  "questions, please call your primary care clinic.  If you do not have a primary care provider, please call [phone] and they will assist you.        Care EveryWhere ID     This is your Care EveryWhere ID. This could be used by other organizations to access your [place] medical records  [id number]        Your Vitals Were     Pulse Temperature Height Pulse Oximetry BMI (Body Mass Index)       73 97.2  F (36.2  C) (Oral) 5' 7\" (1.702 m) 97% 26.61 kg/m2        Blood Pressure from Last 3 Encounters:   08/11/17 125/77   04/14/17 110/62   04/07/17 103/73    Weight from Last 3 Encounters:   08/11/17 169 lb 14.4 oz (77.1 kg)   04/14/17 167 lb 3.2 oz (75.8 kg)   04/06/17 163 lb (73.9 kg)              Today, you had the following     No orders found for display       Primary Care Provider Office Phone # Fax #    Chapo Maldonado [phone]       [address] [facility] [address]  [facility] [address]        Equal Access to Services     [facility]: Hadii aad ku hadasho Soomaali, waaxda luqadaha, qaybta kaalmada adeegyada, mimi briscoe . So [facility] [phone].    ATENCIÓN: Si habla español, tiene a perdomo disposición servicios gratuitos de asistencia lingüística. [facility] [phone].    We comply with applicable federal civil rights laws and [place] laws. We do not discriminate on the basis of race, color, national origin, age, disability sex, sexual orientation or gender identity.            Thank you!     Thank you [place] [facility] [place]. Our goal is always to provide you with excellent care. Hearing back from our patients is one way we can continue to improve our services. Please take a few minutes to complete the written survey that you may receive in the mail after your visit with us. Thank you!             Your Updated Medication List - Protect others around you: Learn how to safely use, store and throw away your medicines at " www.disposemymeds.org.          This list is accurate as of: 8/11/17  5:16 PM.  Always use your most recent med list.                   Brand Name Dispense Instructions for use Diagnosis    IBUPROFEN PO      Take 400 mg by mouth every 4 hours as needed for moderate pain        loratadine 10 MG tablet    CLARITIN     Take 10 mg by mouth daily        traMADol 50 MG tablet    ULTRAM    12 tablet    Take 1 tablet (50 mg) by mouth every 6 hours as needed for breakthrough pain maximum 3 tablet(s) per day    Costochondral chest pain

## 2017-08-11 NOTE — PROGRESS NOTES
SUBJECTIVE:                                                    Jayce Myers is a 47 year old male who presents to clinic today for the following health issues:    Acute Illness   Acute illness concerns: ear infection or sinus infection   Onset:couple days     Fever: no    Chills/Sweats: no    Headache (location?): YES    Sinus Pressure:YES    Conjunctivitis:  no    Ear Pain: YES: right    Rhinorrhea: no     Congestion: no     Sore Throat: no      Cough: no    Wheeze: no     Decreased Appetite: no     Nausea: no     Vomiting: no    Diarrhea:  no     Dysuria/Freq.: no    Fatigue/Achiness: no    Sick/Strep Exposure: no     Therapies Tried and outcome:     Patient says that last night he was feeling dizzy, and he was losing his balance and leaning towards the right. He says that he has had some pain in his right ear, pressure in his sinuses and a headache. He says that he has had dizzy spells in the past, and that the problems are generally related to sinus infections. Patient says that in the past the symptoms are normally more severe, but says that they can be very serious so he would like to make sure he is not developing the same problem. No history of smoking or cough. He has been using over the counter flonase to some relief.    Problem list and histories reviewed & adjusted, as indicated.  Additional history: as documented    Patient Active Problem List   Diagnosis     Nevus     Vestibular neuronitis     Plantar fasciitis     Costochondral chest pain     Abdominal pain     Hyperlipidemia with target LDL less than 130     Epiploic appendagitis     Pain of finger of left hand     Costochondritis, acute     History reviewed. No pertinent surgical history.    Social History   Substance Use Topics     Smoking status: Former Smoker     Packs/day: 0.50     Years: 3.00     Types: Cigarettes     Quit date: 1/26/2003     Smokeless tobacco: Never Used      Comment: social smoking     Alcohol use No     Family History  "  Problem Relation Age of Onset     CANCER Mother      salivary gland?     Thyroid Disease Mother      DIABETES Mother      DM2     Anemia Sister      Jayce ROLON does not known cause     Cancer - colorectal No family hx of          Current Outpatient Prescriptions   Medication Sig Dispense Refill     traMADol (ULTRAM) 50 MG tablet Take 1 tablet (50 mg) by mouth every 6 hours as needed for breakthrough pain maximum 3 tablet(s) per day 12 tablet 0     loratadine (CLARITIN) 10 MG tablet Take 10 mg by mouth daily       IBUPROFEN PO Take 400 mg by mouth every 4 hours as needed for moderate pain       Allergies   Allergen Reactions     Pollen Extract Itching     Hayfever         Reviewed and updated as needed this visit by clinical staff     Reviewed and updated as needed this visit by Provider         ROS:  Positive for ear pain, headaches, sinus pressure and dizziness.    Denies insomnia, chest pain, shortness of breath, cough, heartburn, bowel issues, bladder issues, neck pain, back pain, hip pain, knee pain, ankle pain, or foot pain. Remainder of ROS is negative unless otherwise noted above or in HPI.    This document serves as a record of the services and decisions personally performed and made by Chapo Maldonado MD. It was created on his behalf by Alexis Baptiste, a trained medical scribe. The creation of this document is based the provider's statements to the medical scribe.  Alexis Baptiste 4:19 PM August 11, 2017    OBJECTIVE:     /77  Pulse 73  Temp 97.2  F (36.2  C) (Oral)  Ht 1.702 m (5' 7\")  Wt 77.1 kg (169 lb 14.4 oz)  SpO2 97%  BMI 26.61 kg/m2  Body mass index is 26.61 kg/(m^2).  GENERAL: healthy, alert and no distress  HENT: TM retracted in right ear, ear canals and left TM normal, swelling and pale with green-yellow discharge in nasal passages and redness and irritation of mucous membranes in mouth  NEURO: Normal strength and tone, mentation intact and speech normal  PSYCH: mentation " appears normal, affect normal/bright    Diagnostic Test Results:  No results found for this or any previous visit (from the past 24 hour(s)).    ASSESSMENT/PLAN:     (H69.81) Dysfunction of eustachian tube, right  (primary encounter diagnosis)  Comment: Patient has been using flonase to some relief. Educated patient on vertigo and treatment options.  Plan: Continue flonase and try charly pot. Try pressurizing ears. Follow up as needed.      Patient Instructions               Positional Vertigo          What is positional vertigo?   Positional vertigo is an inner ear problem. It causes brief but sometimes severe feelings of spinning. Some people feel that their head or body is spinning. Others feel the room is spinning. People often say they are dizzy, but dizzy is a very general term. Vertigo, on the other hand, is the very specific feeling of uncontrollable spinning.   Symptoms of positional vertigo happen suddenly when you change the position of your head.   How does it occur?   In the inner part of your ear are 3 semicircular canals. Movement of the fluid in these canals helps your brain maintain your balance and know what position you are in (for example, standing up, lying down, or standing on your head).   Sometimes small crystals of calcium develop and float in the fluid in the inner ear. This can happen after a head injury, with a severe cold, or simply as a part of normal aging. The crystals can cause vertigo when you change head position and they strike against nerve endings in the semicircular canals. Usually the calcium crystals dissolve in a few weeks and stop causing vertigo. However, sometimes the crystals do not dissolve and the vertigo returns from time to time.   What are the symptoms?   A sudden feeling that you are spinning, or that the room is spinning, is the main symptom. You may feel the vertigo when you first wake up. It may seem that any turn of your head brings on brief but intense spells  of vertigo. It may happen when you tilt your head, look up or down, or roll over in bed.   You may have nausea and vomiting along with the vertigo. Even if a spell of vertigo is brief, you may have a feeling of queasiness for several minutes or even hours afterward.   How is it diagnosed?   Your healthcare provider will ask about your symptoms and examine you. You may also be given a Driss-Hallpike position test.   You start the [place]-Hallpike test by sitting upright on the examining table. Your healthcare provider slowly brings your head down over the edge of the table and turns your head to one side. If you have positional vertigo, your provider will see your eyes making fast, jerky movements called nystagmus. If no nystagmus is seen, your provider will repeat the test, this time turning your head to the opposite side, to test the other inner ear. If you then have nystagmus and vertigo, the ear that is pointing toward the floor is the one causing the problem. The nystagmus and vertigo will slow down and stop after 15 to 20 seconds. If you do not move your head, no more symptoms will occur. When you sit back up, you will have vertigo again, but for a shorter time.   Other tests you may have are:   an ear exam   an audiogram to check your hearing   a test of your nerve responses   an electronystagmogram (ENG) test.   How is it treated?   Mild vertigo is often treated with medicine. The most common medicine for this problem is meclizine. It is taken up to 4 times a day for the vertigo and nausea or vomiting. One of the problems with this medicine is that it causes drowsiness. This is not as much of a problem if you have severe vertigo, which usually requires bed rest. Then the medicine can help you sleep and get relief from the vertigo while you sleep.   Your healthcare provider may recommend techniques that use gravity to move the crystals away from the nerve endings into an area of the inner ear that won't cause any  problems. These are called repositioning techniques.   One repositioning technique is the Epley maneuver. It can be very helpful. Your healthcare provider will move your head into 4 positions. You will hold each position for about 30 seconds.   Your healthcare provider may also suggest that you do Sidhu-Daroff exercises. Your provider may recommend that you do these exercises 3 times a day for 2 weeks. To do these exercises:   Start by sitting upright on your bed.   Lie on your left side, with your head angled upward about [facility]. (Imagine that you are looking at the head of someone standing about 6 feet in front of you.) Stay in this position for 30 seconds, or, if you are having vertigo, until the vertigo stops.   Return to the sitting position for 30 seconds.   Lie on your right side, and follow the same routine.   Your healthcare provider may refer you to a physical therapist to learn and practice these repositioning techniques.   Rarely, when repositioning techniques don't help and the vertigo has not gone away after a few weeks, severe cases may eventually require surgery.   How long will the effects last?   Even without treatment, positional vertigo usually goes away within several weeks. Sometimes it recurs despite treatment.   How do I take care of myself?   If your vertigo is mild, you may be able to continue your usual activities, especially if you have opportunities to sit and rest when you have vertigo.   If your vertigo does not allow you to continue your usual routine, you should rest at home.   Use medicine as prescribed by your healthcare provider to help stop symptoms of dizziness, nausea, and vomiting.   Follow your instructions for using the repositioning techniques.   Do not try to drive, operate tools or machinery, or do other tasks, even cooking, that could endanger yourself or others if you suddenly become dizzy.   Follow your healthcare provider's recommendations for follow-up visits.    Contact your healthcare provider if:   Your symptoms seem to be getting worse, more frequent, or longer lasting.   You develop new symptoms, such as a loss of hearing or severe headache.     Published by [employer].  This content is reviewed periodically and is subject to change as new health information becomes available. The information is intended to inform and educate and is not a replacement for medical evaluation, advice, diagnosis or treatment by a healthcare professional.   Developed by [employer].   ? 2010 [employer] and/or its affiliates. All Rights Reserved.   Copyright   [employer] 2011  Adult Health Advisor         -Try using a charly pot with saline solution in both nostrils. Continue to use the flonase.  -It may be a good idea to plug your nose and try to pressurize your ears to see if that helps. Try that at breakfast, lunch and dinner, and don't pressurize too dramatically.  -Let me know if things change or worsen, and I can get you a script for antibiotics.    The information in this document, created by the medical scribe for me, accurately reflects the services I personally performed and the decisions made by me. I have reviewed and approved this document for accuracy prior to leaving the patient care area.   Chapo Maldonado MD 4:20 PM August 11, 2017  [facility] [place]

## 2017-09-08 ENCOUNTER — ALLIED HEALTH/NURSE VISIT (OUTPATIENT)
Dept: NURSING | Facility: CLINIC | Age: 47
End: 2017-09-08
Payer: COMMERCIAL

## 2017-09-08 DIAGNOSIS — Z23 NEED FOR PROPHYLACTIC VACCINATION AND INOCULATION AGAINST INFLUENZA: Primary | ICD-10-CM

## 2017-09-08 PROCEDURE — 99207 ZZC NO CHARGE NURSE ONLY: CPT

## 2017-09-08 PROCEDURE — 90471 IMMUNIZATION ADMIN: CPT

## 2017-09-08 PROCEDURE — 90688 IIV4 VACCINE SPLT 0.5 ML IM: CPT

## 2017-09-08 NOTE — PROGRESS NOTES
Injectable Influenza Immunization Documentation    1.  Are you sick today? (Fever of 100.5 or higher on the day of the clinic)   No    2.  Have you ever had Guillain-[place] Syndrome within 6 weeks of an influenza vaccionation?  No    3. Do you have a life-threatening allergy to eggs?  No    4. Do you have a life-threatening allergy to a component of the vaccine? May include antibiotics, gelatin or latex.  No     5. Have you ever had a reaction to a dose of flu vaccine that needed immediate medical attention?  No     Form completed by Jeny Motley MA

## 2017-09-08 NOTE — MR AVS SNAPSHOT
After Visit Summary   9/8/2017    Jayce Myers    MRN: [id number]           Patient Information     Date Of Birth          1970        Visit Information        Provider Department      9/8/2017 4:00 PM RD LAXMI TOLEDO/LPN [facility]        Today's Diagnoses     Need for prophylactic vaccination and inoculation against influenza    -  1       Follow-ups after your visit        Who to contact     If you have questions or need follow up information about today's clinic visit or your schedule please contact [facility] directly at [phone].  Normal or non-critical lab and imaging results will be communicated to you by OP3Nvoicehart, letter or phone within 4 business days after the clinic has received the results. If you do not hear from us within 7 days, please contact the clinic through Zympi or phone. If you have a critical or abnormal lab result, we will notify you by phone as soon as possible.  Submit refill requests through Zympi or call your pharmacy and they will forward the refill request to us. Please allow 3 business days for your refill to be completed.          Additional Information About Your Visit        MyChart Information     Zympi gives you secure access to your electronic health record. If you see a primary care provider, you can also send messages to your care team and make appointments. If you have questions, please call your primary care clinic.  If you do not have a primary care provider, please call [phone] and they will assist you.        Care EveryWhere ID     This is your Care EveryWhere ID. This could be used by other organizations to access your [facility] medical records  [id number]         Blood Pressure from Last 3 Encounters:   08/11/17 125/77   04/14/17 110/62   04/07/17 103/73    Weight from Last 3 Encounters:   08/11/17 169 lb 14.4 oz (77.1 kg)   04/14/17 167 lb 3.2 oz (75.8 kg)   04/06/17 163 lb (73.9 kg)              We Performed  the Following     FLU VACCINE, 3 YRS +, IM (QUADRIVALENT W/PRESERVATIVES/MULTI-DOSE) [06483]     Vaccine Administration, Initial [31292]        Primary Care Provider Office Phone # Fax #    Chapo Maldonado [phone]       [address] [facility] [address]  [facility] [address]        Equal Access to Services     [facility]: Hadii aad ku hadasho Soomaali, waaxda luqadaha, qaybta kaalmada adeegyada, waxay teresitain hayaan adeeg khlaurynmarlene ladwight ah. So [facility] [phone].    ATENCIÓN: Si habla español, tiene a perdomo disposición servicios gratuitos de asistencia lingüística. Anabell al [phone].    We comply with applicable federal civil rights laws and [place] laws. We do not discriminate on the basis of race, color, national origin, age, disability sex, sexual orientation or gender identity.            Thank you!     Thank you for choosing [facility]  for your care. Our goal is always to provide you with excellent care. Hearing back from our patients is one way we can continue to improve our services. Please take a few minutes to complete the written survey that you may receive in the mail after your visit with us. Thank you!             Your Updated Medication List - Protect others around you: Learn how to safely use, store and throw away your medicines at www.disposemymeds.org.          This list is accurate as of: 9/8/17  5:18 PM.  Always use your most recent med list.                   Brand Name Dispense Instructions for use Diagnosis    IBUPROFEN PO      Take 400 mg by mouth every 4 hours as needed for moderate pain        loratadine 10 MG tablet    CLARITIN     Take 10 mg by mouth daily        traMADol 50 MG tablet    ULTRAM    12 tablet    Take 1 tablet (50 mg) by mouth every 6 hours as needed for breakthrough pain maximum 3 tablet(s) per day    Costochondral chest pain

## 2017-12-06 ENCOUNTER — OFFICE VISIT (OUTPATIENT)
Dept: FAMILY MEDICINE | Facility: CLINIC | Age: 47
End: 2017-12-06
Payer: COMMERCIAL

## 2017-12-06 VITALS
DIASTOLIC BLOOD PRESSURE: 84 MMHG | HEART RATE: 99 BPM | SYSTOLIC BLOOD PRESSURE: 132 MMHG | BODY MASS INDEX: 27.6 KG/M2 | TEMPERATURE: 96.9 F | OXYGEN SATURATION: 96 % | WEIGHT: 176.2 LBS

## 2017-12-06 DIAGNOSIS — L24.4 IRRITANT CONTACT DERMATITIS DUE TO DRUG IN CONTACT WITH SKIN: Primary | ICD-10-CM

## 2017-12-06 PROCEDURE — 99213 OFFICE O/P EST LOW 20 MIN: CPT | Performed by: NURSE PRACTITIONER

## 2017-12-06 RX ORDER — HYDROCORTISONE VALERATE 2 MG/G
OINTMENT TOPICAL
Qty: 15 G | Refills: 0 | Status: SHIPPED | OUTPATIENT
Start: 2017-12-06 | End: 2018-06-30

## 2017-12-06 NOTE — MR AVS SNAPSHOT
After Visit Summary   12/6/2017    Jayce Myers    MRN: [id number]           Patient Information     Date Of Birth          1970        Visit Information        Provider Department      12/6/2017 5:30 PM Christi Rodriguez APRN [facility]        Today's Diagnoses     Irritant contact dermatitis due to drug in contact with skin    -  1      Care Instructions      Stop using the bacitracin, soap and bandages  Use the steroid with a small amount of water on your skin and fingers  Apply in a thin layer. Avoid your eyes, mouth, nose- wash hands after applying  Can stop once rash is gone  Understanding Contact Dermatitis     A cool, moist compress can help reduce itching.     Contact dermatitis is a common type of skin rash. It s caused by something that touches the skin and makes it irritated and inflamed. It can occur on skin on any part of the body, such as the face, neck, hands, arms, and legs. Contact dermatitis is not spread from person to person.  Often, the reaction of contact dermatitis occurs 1 to 2 days after contact with the offending agent.  How to say it  FARIBA-tact dkb-syn-SE-tis   What causes contact dermatitis?  It s caused by something that irritates the skin, or that creates an allergic reaction on the skin. People can get contact dermatitis from many kinds of things. These include:    Plant oils in poison ivy, oak, and sumac    Chemicals in household , solvents, and glue    Chemicals in makeup, soap, laundry detergent, perfume, acne cream, and hair products    Certain medicines, such as neomycin, bacitracin, benzocaine, and thimerosal    Metals such as nickel, found in some jewelry and watch bands     The sticky material on the back of bandages and tape (adhesive)    Things that can cause tiny breaks in the skin, such as wood, fiberglass, metal tools, and plant thorns    Rubber latex in surgical gloves and other medical supplies  Dermatitis can also be  caused by the skin being damp for long periods of time. This can happen from washing your hands too often, or working with wet materials.  Symptoms of contact dermatitis  Symptoms can include skin that is:    Blistered    Burning    Cracked    Dry    Itchy    Painful    Red    Rough, thickened, and leathery    Swollen    Warm  The blisters may ooze fluid and form crusts.  Treatment for contact dermatitis  Treatment is done to help relieve itching and reduce inflammation. The rash should go away in a few days to a few weeks. Treatments include:    Cool, moist compress. Use a clean damp cloth. Put it on the area for 20 to 30 minutes, 5 to 6 times a day for the first 3 days.    Steroid cream or ointment. You can apply this medicine several times a day on clean skin.    Oral corticosteroid. Your healthcare provider may prescribe this medicine if you have severe skin symptoms on a large part of your body.  Your healthcare provider may give you a steroid injection instead of pills.    Oral antihistamine. This medicine can help reduce itching.    Colloidal oatmeal bath. Soaking in water with colloidal oatmeal can help soothe skin.    Plain cream, lotion, or ointment. Cream, lotion, or ointment without medicine can help to soothe and protect your skin.  Living with contact dermatitis  Talk with your healthcare provider about what may have caused your contact dermatitis. Patch testing may help you figure out what caused the rash so you can avoid further contact with it. Once you learn what caused your rash, make sure to avoid that substance. If your skin comes into contact with it again, make sure to wash your skin right away. If you can t avoid the substance, wear gloves or other protective clothing before you touch it. Or use a cream, lotion, or ointment to protect your skin.  When to call your healthcare provider  Call your healthcare provider right away if you have any of these:    Fever of 100.4 F (38 C) or higher, or  as directed    Symptoms that don t get better, or get worse    New symptoms   Date Last Reviewed: 5/1/2016 2000-2017 [employer]. [address] [place] [address], [place] [address]. All rights reserved. This information is not intended as a substitute for professional medical care. Always follow your healthcare professional's instructions.                Follow-ups after your visit        Who to contact     If you have questions or need follow up information about today's clinic visit or your schedule please contact [facility] directly at [phone].  Normal or non-critical lab and imaging results will be communicated to you by MindShare Networkshart, letter or phone within 4 business days after the clinic has received the results. If you do not hear from us within 7 days, please contact the clinic through LogoneXt or phone. If you have a critical or abnormal lab result, we will notify you by phone as soon as possible.  Submit refill requests through [employer] or call your pharmacy and they will forward the refill request to us. Please allow 3 business days for your refill to be completed.          Additional Information About Your Visit        MyCharAzure Power Information     [employer] gives you secure access to your electronic health record. If you see a primary care provider, you can also send messages to your care team and make appointments. If you have questions, please call your primary care clinic.  If you do not have a primary care provider, please call [phone] and they will assist you.        Care EveryWhere ID     This is your Care EveryWhere ID. This could be used by other organizations to access your [facility] medical records  [id number]        Your Vitals Were     Pulse Temperature Pulse Oximetry BMI (Body Mass Index)          99 96.9  F (36.1  C) (Oral) 96% 27.6 kg/m2         Blood Pressure from Last 3 Encounters:   12/06/17 132/84   08/11/17 125/77   04/14/17 110/62    Weight from Last 3  Encounters:   12/06/17 176 lb 3.2 oz (79.9 kg)   08/11/17 169 lb 14.4 oz (77.1 kg)   04/14/17 167 lb 3.2 oz (75.8 kg)              Today, you had the following     No orders found for display         Today's Medication Changes          These changes are accurate as of: 12/6/17  6:00 PM.  If you have any questions, ask your nurse or doctor.               Start taking these medicines.        Dose/Directions    hydrocortisone valerate 0.2 % ointment   Commonly known as:  WEST-JAYDON   Used for:  Irritant contact dermatitis due to drug in contact with skin   Started by:  Christi Rodriguez APRN CNP        Apply sparingly to affected area three times daily for 14 days.   Quantity:  15 g   Refills:  0            Where to get your medicines      These medications were sent to [facility] [address] [address] [address]     Phone:  [phone]     hydrocortisone valerate 0.2 % ointment                Primary Care Provider Office Phone # Fax #    Chapo Maldonado [phone]       [address]  [facility] [address]        Equal Access to Services     [facility]: Hadii kathryn maldonado hadasho Soomaali, waaxda luqadaha, qaybta kaalmada adeegyada, mimi vazquez. So [facility] [phone].    ATENCIÓN: Si habla español, tiene a perdomo disposición servicios gratuitos de asistencia lingüística. Llame al [phone].    We comply with applicable federal civil rights laws and [place] laws. We do not discriminate on the basis of race, color, national origin, age, disability, sex, sexual orientation, or gender identity.            Thank you!     Thank you for choosing [facility]  for your care. Our goal is always to provide you with excellent care. Hearing back from our patients is one way we can continue to improve our services. Please take a few minutes to complete the written survey that  you may receive in the mail after your visit with us. Thank you!             Your Updated Medication List - Protect others around you: Learn how to safely use, store and throw away your medicines at www.disposemymeds.org.          This list is accurate as of: 12/6/17  6:00 PM.  Always use your most recent med list.                   Brand Name Dispense Instructions for use Diagnosis    hydrocortisone valerate 0.2 % ointment    WEST-JAYDON    15 g    Apply sparingly to affected area three times daily for 14 days.    Irritant contact dermatitis due to drug in contact with skin       IBUPROFEN PO      Take 400 mg by mouth every 4 hours as needed for moderate pain        loratadine 10 MG tablet    CLARITIN     Take 10 mg by mouth daily        traMADol 50 MG tablet    ULTRAM    12 tablet    Take 1 tablet (50 mg) by mouth every 6 hours as needed for breakthrough pain maximum 3 tablet(s) per day    Costochondral chest pain

## 2017-12-06 NOTE — PATIENT INSTRUCTIONS
Stop using the bacitracin, soap and bandages  Use the steroid with a small amount of water on your skin and fingers  Apply in a thin layer. Avoid your eyes, mouth, nose- wash hands after applying  Can stop once rash is gone  Understanding Contact Dermatitis     A cool, moist compress can help reduce itching.     Contact dermatitis is a common type of skin rash. It s caused by something that touches the skin and makes it irritated and inflamed. It can occur on skin on any part of the body, such as the face, neck, hands, arms, and legs. Contact dermatitis is not spread from person to person.  Often, the reaction of contact dermatitis occurs 1 to 2 days after contact with the offending agent.  How to say it  FARIBA-tact [id number]   What causes contact dermatitis?  It s caused by something that irritates the skin, or that creates an allergic reaction on the skin. People can get contact dermatitis from many kinds of things. These include:    Plant oils in poison ivy, oak, and sumac    Chemicals in household , solvents, and glue    Chemicals in makeup, soap, laundry detergent, perfume, acne cream, and hair products    Certain medicines, such as neomycin, bacitracin, benzocaine, and thimerosal    Metals such as nickel, found in some jewelry and watch bands     The sticky material on the back of bandages and tape (adhesive)    Things that can cause tiny breaks in the skin, such as wood, fiberglass, metal tools, and plant thorns    Rubber latex in surgical gloves and other medical supplies  Dermatitis can also be caused by the skin being damp for long periods of time. This can happen from washing your hands too often, or working with wet materials.  Symptoms of contact dermatitis  Symptoms can include skin that is:    Blistered    Burning    Cracked    Dry    Itchy    Painful    Red    Rough, thickened, and leathery    Swollen    Warm  The blisters may ooze fluid and form crusts.  Treatment for contact  dermatitis  Treatment is done to help relieve itching and reduce inflammation. The rash should go away in a few days to a few weeks. Treatments include:    Cool, moist compress. Use a clean damp cloth. Put it on the area for 20 to 30 minutes, 5 to 6 times a day for the first 3 days.    Steroid cream or ointment. You can apply this medicine several times a day on clean skin.    Oral corticosteroid. Your healthcare provider may prescribe this medicine if you have severe skin symptoms on a large part of your body.  Your healthcare provider may give you a steroid injection instead of pills.    Oral antihistamine. This medicine can help reduce itching.    Colloidal oatmeal bath. Soaking in water with colloidal oatmeal can help soothe skin.    Plain cream, lotion, or ointment. Cream, lotion, or ointment without medicine can help to soothe and protect your skin.  Living with contact dermatitis  Talk with your healthcare provider about what may have caused your contact dermatitis. Patch testing may help you figure out what caused the rash so you can avoid further contact with it. Once you learn what caused your rash, make sure to avoid that substance. If your skin comes into contact with it again, make sure to wash your skin right away. If you can t avoid the substance, wear gloves or other protective clothing before you touch it. Or use a cream, lotion, or ointment to protect your skin.  When to call your healthcare provider  Call your healthcare provider right away if you have any of these:    Fever of 100.4 F (38 C) or higher, or as directed    Symptoms that don t get better, or get worse    New symptoms   Date Last Reviewed: 5/1/2016 2000-2017 [employer]. [address] [place] [address], [place] [address]. All rights reserved. This information is not intended as a substitute for professional medical care. Always follow your healthcare professional's instructions.

## 2017-12-06 NOTE — NURSING NOTE
"Chief Complaint   Patient presents with     Derm Problem       Initial /84  Pulse 99  Temp 96.9  F (36.1  C) (Oral)  Wt 176 lb 3.2 oz (79.9 kg)  SpO2 96%  BMI 27.6 kg/m2 Estimated body mass index is 27.6 kg/(m^2) as calculated from the following:    Height as of 8/11/17: 5' 7\" (1.702 m).    Weight as of this encounter: 176 lb 3.2 oz (79.9 kg).  Medication Reconciliation: complete     Evan Srivastava MA      "

## 2017-12-06 NOTE — PROGRESS NOTES
SUBJECTIVE:   Jayce Myers is a 47 year old male who presents to clinic today for the following health issues:    Rash  Onset: Saturday Night started out like a scab and noticed a small cut. Scratched it and bled a little.     Description:   Location: Middle of lower neck   Character: round, blotchy, burning, red  Itching (Pruritis): YES- very    Progression of Symptoms:  same    Accompanying Signs & Symptoms:  Fever: no   Body aches or joint pain: no   Sore throat symptoms: no   Recent cold symptoms: no     History:   Previous similar rash: no     Precipitating factors:   Exposure to similar rash: no   New exposures: None   Recent travel: no     Alleviating factors:  none    Therapies Tried and outcome: washed with Olay soap and water, neosporin but nothing changed.       Problem list and histories reviewed & adjusted, as indicated.  Additional history: as documented    Reviewed and updated as needed this visit by clinical staffTobacco  Allergies  Meds  Med Hx  Surg Hx  Fam Hx  Soc Hx      Reviewed and updated as needed this visit by Provider         ROS:  C: NEGATIVE for fever, chills, change in weight  E/M: NEGATIVE for ear, mouth and throat problems  R: NEGATIVE for significant cough or SOB, wheezing    OBJECTIVE:     /84  Pulse 99  Temp 96.9  F (36.1  C) (Oral)  Wt 176 lb 3.2 oz (79.9 kg)  SpO2 96%  BMI 27.6 kg/m2  Body mass index is 27.6 kg/(m^2).  GENERAL: healthy, alert and no distress  EYES: Eyes grossly normal to inspection, PERRL and conjunctivae and sclerae normal  NECK: no adenopathy, no asymmetry, masses, or scars and thyroid normal to palpation  SKIN: bright red scaly 8 cm x 2.5 cm rectangular area with deeper more confluent circular center and slightly patchy area at borders, outer portion follows line of a bandage, non-tender to touch     Diagnostic Test Results:  none     ASSESSMENT/PLAN:     (L24.4) Irritant contact dermatitis due to drug in contact with skin  (primary  encounter diagnosis)  Comment:   Plan: hydrocortisone valerate (WEST-JAYDON) 0.2 %         ointment    Likely reaction to neosporin and adhesive from the bandage.  Should respond well to steroid.  Contact us if no improvement or has fever, pain.      Patient Instructions       Stop using the bacitracin, soap and bandages  Use the steroid with a small amount of water on your skin and fingers  Apply in a thin layer. Avoid your eyes, mouth, nose- wash hands after applying  Can stop once rash is gone  Understanding Contact Dermatitis     A cool, moist compress can help reduce itching.     Contact dermatitis is a common type of skin rash. It s caused by something that touches the skin and makes it irritated and inflamed. It can occur on skin on any part of the body, such as the face, neck, hands, arms, and legs. Contact dermatitis is not spread from person to person.  Often, the reaction of contact dermatitis occurs 1 to 2 days after contact with the offending agent.  How to say it  FARIBA-tact lkg-nal-LI-tis   What causes contact dermatitis?  It s caused by something that irritates the skin, or that creates an allergic reaction on the skin. People can get contact dermatitis from many kinds of things. These include:    Plant oils in poison ivy, oak, and sumac    Chemicals in household , solvents, and glue    Chemicals in makeup, soap, laundry detergent, perfume, acne cream, and hair products    Certain medicines, such as neomycin, bacitracin, benzocaine, and thimerosal    Metals such as nickel, found in some jewelry and watch bands     The sticky material on the back of bandages and tape (adhesive)    Things that can cause tiny breaks in the skin, such as wood, fiberglass, metal tools, and plant thorns    Rubber latex in surgical gloves and other medical supplies  Dermatitis can also be caused by the skin being damp for long periods of time. This can happen from washing your hands too often, or working with wet  materials.  Symptoms of contact dermatitis  Symptoms can include skin that is:    Blistered    Burning    Cracked    Dry    Itchy    Painful    Red    Rough, thickened, and leathery    Swollen    Warm  The blisters may ooze fluid and form crusts.  Treatment for contact dermatitis  Treatment is done to help relieve itching and reduce inflammation. The rash should go away in a few days to a few weeks. Treatments include:    Cool, moist compress. Use a clean damp cloth. Put it on the area for 20 to 30 minutes, 5 to 6 times a day for the first 3 days.    Steroid cream or ointment. You can apply this medicine several times a day on clean skin.    Oral corticosteroid. Your healthcare provider may prescribe this medicine if you have severe skin symptoms on a large part of your body.  Your healthcare provider may give you a steroid injection instead of pills.    Oral antihistamine. This medicine can help reduce itching.    Colloidal oatmeal bath. Soaking in water with colloidal oatmeal can help soothe skin.    Plain cream, lotion, or ointment. Cream, lotion, or ointment without medicine can help to soothe and protect your skin.  Living with contact dermatitis  Talk with your healthcare provider about what may have caused your contact dermatitis. Patch testing may help you figure out what caused the rash so you can avoid further contact with it. Once you learn what caused your rash, make sure to avoid that substance. If your skin comes into contact with it again, make sure to wash your skin right away. If you can t avoid the substance, wear gloves or other protective clothing before you touch it. Or use a cream, lotion, or ointment to protect your skin.  When to call your healthcare provider  Call your healthcare provider right away if you have any of these:    Fever of 100.4 F (38 C) or higher, or as directed    Symptoms that don t get better, or get worse    New symptoms   Date Last Reviewed: 5/1/2016 2000-2017 [employer]. [address] [place] [address], [place] [address]. All rights reserved. This information is not intended as a substitute for professional medical care. Always follow your healthcare professional's instructions.            EDI Salmeron [facility]

## 2018-01-26 ENCOUNTER — OFFICE VISIT (OUTPATIENT)
Dept: FAMILY MEDICINE | Facility: CLINIC | Age: 48
End: 2018-01-26
Payer: COMMERCIAL

## 2018-01-26 VITALS
DIASTOLIC BLOOD PRESSURE: 78 MMHG | BODY MASS INDEX: 27.82 KG/M2 | OXYGEN SATURATION: 97 % | HEART RATE: 79 BPM | TEMPERATURE: 97.9 F | SYSTOLIC BLOOD PRESSURE: 114 MMHG | WEIGHT: 177.6 LBS

## 2018-01-26 DIAGNOSIS — M79.661 PAIN IN RIGHT SHIN: Primary | ICD-10-CM

## 2018-01-26 DIAGNOSIS — R25.2 CRAMP OF LIMB: ICD-10-CM

## 2018-01-26 DIAGNOSIS — S80.11XA CONTUSION OF RIGHT LOWER EXTREMITY, INITIAL ENCOUNTER: ICD-10-CM

## 2018-01-26 PROCEDURE — 99214 OFFICE O/P EST MOD 30 MIN: CPT | Performed by: PHYSICIAN ASSISTANT

## 2018-01-26 NOTE — PATIENT INSTRUCTIONS
Epsom Salt soaks for 20 min a night  Natural Vitality Calm Magnesium 1-2 tsp in water every night  Elevate leg when you can  Return to clinic for any new or worsening symptoms or go to ER Urgent care in off hours

## 2018-01-26 NOTE — PROGRESS NOTES
SUBJECTIVE:   Jayce Myers is a 47 year old male who presents to clinic today for the following health issues:    For over a week has had discoloring   Discoloring on his lower right calf  Hurts more than a bruise  Does not remember hurting his leg at all    Reports his leg starting hurting about a week ago Thursday, which prompted him to look at his lower ankle  His wife rubbed the area and the next day it was bruised    Works in a truck shop for work. Admits he could have bumped it and not realized it.  He takes advil on a regular basis because of lots of aches and pains.         Problem list and histories reviewed & adjusted, as indicated.  Additional history: as documented    ROS:  C: NEGATIVE for fever, chills, change in weight  I: NEGATIVE for worrisome rashes, moles or lesions  E: NEGATIVE for vision changes or irritation  E/M: NEGATIVE for ear, mouth and throat problems  R: NEGATIVE for significant cough or SOB  B: NEGATIVE for masses, tenderness or discharge  CV: NEGATIVE for chest pain, palpitations or peripheral edema  GI: NEGATIVE for nausea, abdominal pain, heartburn, or change in bowel habits  : NEGATIVE for frequency, dysuria, or hematuria  MUSCULOSKELETAL:NEGATIVE for joint stiffness , joint swelling  and joint warmth   N: NEGATIVE for weakness, dizziness or paresthesias  E: NEGATIVE for temperature intolerance, skin/hair changes  H: NEGATIVE for bleeding problems  P: NEGATIVE for changes in mood or affect    Patient Active Problem List   Diagnosis     Nevus     Vestibular neuronitis     Plantar fasciitis     Costochondral chest pain     Abdominal pain     Hyperlipidemia with target LDL less than 130     Epiploic appendagitis     Pain of finger of left hand     Costochondritis, acute     No past surgical history on file.    Social History   Substance Use Topics     Smoking status: Former Smoker     Packs/day: 0.50     Years: 3.00     Types: Cigarettes     Quit date: 1/26/2003     Smokeless  tobacco: Never Used      Comment: social smoking     Alcohol use No     Family History   Problem Relation Age of Onset     CANCER Mother      salivary gland?     Thyroid Disease Mother      DIABETES Mother      DM2     Anemia Sister      Jayce ROLON does not known cause     Cancer - colorectal No family hx of            Labs reviewed in EPIC  BP Readings from Last 3 Encounters:   01/26/18 114/78   12/06/17 132/84   08/11/17 125/77    Wt Readings from Last 3 Encounters:   01/26/18 177 lb 9.6 oz (80.6 kg)   12/06/17 176 lb 3.2 oz (79.9 kg)   08/11/17 169 lb 14.4 oz (77.1 kg)                    OBJECTIVE:                                                    /78  Pulse 79  Temp 97.9  F (36.6  C) (Oral)  Wt 177 lb 9.6 oz (80.6 kg)  SpO2 97%  BMI 27.82 kg/m2 Body mass index is 27.82 kg/(m^2).   GENERAL: healthy, alert, well nourished, well hydrated, no distress  EYES: Eyes grossly normal to inspection, extraocular movements - intact, and PERRL  HENT: ear canals- normal; TMs- normal; Nose- normal; Mouth- no ulcers, no lesions  NECK: no tenderness, no adenopathy, no asymmetry, no masses, no stiffness; thyroid- normal to palpation  RESP: lungs clear to auscultation - no rales, no rhonchi, no wheezes  CV: regular rates and rhythm, normal S1 S2, no S3 or S4 and no murmur, no click or rub -  MS: extremities- no gross deformities noted, no edema  Right lower leg: approximately 4 cm x 4 cm area of healing bruise over anterior shin, non-tender. Full ankle and toe ROM and strength. No swelling or redness. Sensation intact to light touch. Negative Geovanny's sign. No calf tenderness. Knee ROM and strength intact  PSYCH: Alert and oriented times 3; speech- coherent , normal rate and volume; able to articulate logical thoughts, able to abstract reason, no tangential thoughts, no hallucinations or delusions, affect- normal       ASSESSMENT/PLAN:                                                        ICD-10-CM    1. Pain in right  "perkins M79.661    2. Contusion of right lower extremity, initial encounter S80.11XA    3. Cramp of limb R25.2      Pain in shin was likely from moving very heavy boxes last week. The bruise more likely was from aggressive massage. Both appear benign and healing well. No sign of DVT. Reassurance provided. Advised to stay hydrated and add magnesium daily to help with muscle cramps. Return to clinic for any new or worsening symptoms or go to ER Urgent care in off hours      Patient Instructions   Epsom Salt soaks for 20 min a night  Natural Vitality Calm Magnesium 1-2 tsp in water every night  Elevate leg when you can  Return to clinic for any new or worsening symptoms or go to ER Urgent care in off hours          Estimated body mass index is 27.82 kg/(m^2) as calculated from the following:    Height as of 8/11/17: 5' 7\" (1.702 m).    Weight as of this encounter: 177 lb 9.6 oz (80.6 kg).       Kimi Slade  [facility]    "

## 2018-01-26 NOTE — MR AVS SNAPSHOT
After Visit Summary   1/26/2018    Jayce Myers    MRN: [id number]           Patient Information     Date Of Birth          1970        Visit Information        Provider Department      1/26/2018 10:00 AM Kimi Slade PA-C [facility]        Care Instructions    Epsom Salt soaks for 20 min a night  Natural Vitality Calm Magnesium 1-2 tsp in water every night  Elevate leg when you can  Return to clinic for any new or worsening symptoms or go to ER Urgent care in off hours            Follow-ups after your visit        Who to contact     If you have questions or need follow up information about today's clinic visit or your schedule please contact [facility] directly at [phone].  Normal or non-critical lab and imaging results will be communicated to you by VideoProshart, letter or phone within 4 business days after the clinic has received the results. If you do not hear from us within 7 days, please contact the clinic through VideoProshart or phone. If you have a critical or abnormal lab result, we will notify you by phone as soon as possible.  Submit refill requests through Zafu or call your pharmacy and they will forward the refill request to us. Please allow 3 business days for your refill to be completed.          Additional Information About Your Visit        MyChart Information     Zafu gives you secure access to your electronic health record. If you see a primary care provider, you can also send messages to your care team and make appointments. If you have questions, please call your primary care clinic.  If you do not have a primary care provider, please call [phone] and they will assist you.        Care EveryWhere ID     This is your Care EveryWhere ID. This could be used by other organizations to access your [place] medical records  [id number]        Your Vitals Were     Pulse Temperature Pulse Oximetry BMI (Body Mass Index)          79  97.9  F (36.6  C) (Oral) 97% 27.82 kg/m2         Blood Pressure from Last 3 Encounters:   01/26/18 114/78   12/06/17 132/84   08/11/17 125/77    Weight from Last 3 Encounters:   01/26/18 177 lb 9.6 oz (80.6 kg)   12/06/17 176 lb 3.2 oz (79.9 kg)   08/11/17 169 lb 14.4 oz (77.1 kg)              Today, you had the following     No orders found for display       Primary Care Provider Office Phone # Fax #    Chapo Maldonado [phone]       [address] [facility] [address]  [facility] [address]        Equal Access to Services     CONSTANCE FINE : Hadii aad ku hadasho Sovalentina, waaxda luqadaha, qaybta kaalmada adeegyada, waxjoanne briscoe . So [facility] [phone].    ATENCIÓN: Si habla español, tiene a perdomo disposición servicios gratuitos de asistencia lingüística. Anabell al [phone].    We comply with applicable federal civil rights laws and [place] laws. We do not discriminate on the basis of race, color, national origin, age, disability, sex, sexual orientation, or gender identity.            Thank you!     Thank you for choosing [facility]  for your care. Our goal is always to provide you with excellent care. Hearing back from our patients is one way we can continue to improve our services. Please take a few minutes to complete the written survey that you may receive in the mail after your visit with us. Thank you!             Your Updated Medication List - Protect others around you: Learn how to safely use, store and throw away your medicines at www.disposemymeds.org.          This list is accurate as of 1/26/18 10:23 AM.  Always use your most recent med list.                   Brand Name Dispense Instructions for use Diagnosis    hydrocortisone valerate 0.2 % ointment    WEST-JAYDON    15 g    Apply sparingly to affected area three times daily for 14 days.    Irritant contact dermatitis due to drug in contact with skin       IBUPROFEN PO      Take 400 mg by  mouth every 4 hours as needed for moderate pain        loratadine 10 MG tablet    CLARITIN     Take 10 mg by mouth daily        traMADol 50 MG tablet    ULTRAM    12 tablet    Take 1 tablet (50 mg) by mouth every 6 hours as needed for breakthrough pain maximum 3 tablet(s) per day    Costochondral chest pain

## 2018-02-09 NOTE — PROGRESS NOTES
Erroneous encounter, he did not realize this is a WORKMANS COMP issue for which he is already being seen at [facility] where [employer] ( his employer ) sent him

## 2018-02-12 ENCOUNTER — OFFICE VISIT (OUTPATIENT)
Dept: FAMILY MEDICINE | Facility: CLINIC | Age: 48
End: 2018-02-12
Payer: COMMERCIAL

## 2018-02-12 VITALS
SYSTOLIC BLOOD PRESSURE: 124 MMHG | HEART RATE: 80 BPM | HEIGHT: 67 IN | TEMPERATURE: 98 F | OXYGEN SATURATION: 98 % | WEIGHT: 180.7 LBS | DIASTOLIC BLOOD PRESSURE: 78 MMHG | BODY MASS INDEX: 28.36 KG/M2

## 2018-02-12 DIAGNOSIS — Z53.9 ERRONEOUS ENCOUNTER--DISREGARD: Primary | ICD-10-CM

## 2018-02-12 NOTE — NURSING NOTE
"Chief Complaint   Patient presents with     Musculoskeletal Problem       Initial /78  Pulse 80  Temp 98  F (36.7  C) (Oral)  Ht 5' 7.32\" (1.71 m)  Wt 180 lb 11.2 oz (82 kg)  SpO2 98%  BMI 28.03 kg/m2 Estimated body mass index is 28.03 kg/(m^2) as calculated from the following:    Height as of this encounter: 5' 7.32\" (1.71 m).    Weight as of this encounter: 180 lb 11.2 oz (82 kg).  Medication Reconciliation: complete     Evan Srivastava MA      "

## 2018-02-12 NOTE — MR AVS SNAPSHOT
"              After Visit Summary   2/12/2018    Jayce Myers    MRN: [id number]           Patient Information     Date Of Birth          1970        Visit Information        Provider Department      2/12/2018 3:00 PM Tommy Wilder MD [facility]        Today's Diagnoses     ERRONEOUS ENCOUNTER--DISREGARD    -  1       Follow-ups after your visit        Who to contact     If you have questions or need follow up information about today's clinic visit or your schedule please contact [facility] directly at [phone].  Normal or non-critical lab and imaging results will be communicated to you by CrayonPixelhart, letter or phone within 4 business days after the clinic has received the results. If you do not hear from us within 7 days, please contact the clinic through [employer] or phone. If you have a critical or abnormal lab result, we will notify you by phone as soon as possible.  Submit refill requests through [employer] or call your pharmacy and they will forward the refill request to us. Please allow 3 business days for your refill to be completed.          Additional Information About Your Visit        MyChart Information     [employer] gives you secure access to your electronic health record. If you see a primary care provider, you can also send messages to your care team and make appointments. If you have questions, please call your primary care clinic.  If you do not have a primary care provider, please call [phone] and they will assist you.        Care EveryWhere ID     This is your Care EveryWhere ID. This could be used by other organizations to access your [place] medical records  [id number]        Your Vitals Were     Pulse Temperature Height Pulse Oximetry BMI (Body Mass Index)       80 98  F (36.7  C) (Oral) 5' 7.32\" (1.71 m) 98% 28.03 kg/m2        Blood Pressure from Last 3 Encounters:   02/12/18 124/78   01/26/18 114/78   12/06/17 132/84    Weight from Last 3 " Encounters:   02/12/18 180 lb 11.2 oz (82 kg)   01/26/18 177 lb 9.6 oz (80.6 kg)   12/06/17 176 lb 3.2 oz (79.9 kg)              Today, you had the following     No orders found for display       Primary Care Provider Office Phone # Fax #    Chapo Maldonado [phone]       [address] [facility] [address]  [facility] [address]        Equal Access to Services     CONSTANCE FINE : Hadii aad ku hadasho Soomaali, waaxda luqadaha, qaybta kaalmada adeegyada, waxay idiin hayaan adeeg kharamarlene ladwight . So [facility] [phone].    ATENCIÓN: Si habla español, tiene a perdomo disposición servicios gratuitos de asistencia lingüística. [facility] [phone].    We comply with applicable federal civil rights laws and [place] laws. We do not discriminate on the basis of race, color, national origin, age, disability, sex, sexual orientation, or gender identity.            Thank you!     Thank you for choosing [facility]  for your care. Our goal is always to provide you with excellent care. Hearing back from our patients is one way we can continue to improve our services. Please take a few minutes to complete the written survey that you may receive in the mail after your visit with us. Thank you!             Your Updated Medication List - Protect others around you: Learn how to safely use, store and throw away your medicines at www.disposemymeds.org.          This list is accurate as of 2/12/18  5:29 PM.  Always use your most recent med list.                   Brand Name Dispense Instructions for use Diagnosis    hydrocortisone valerate 0.2 % ointment    WEST-JAYDON    15 g    Apply sparingly to affected area three times daily for 14 days.    Irritant contact dermatitis due to drug in contact with skin       IBUPROFEN PO      Take 400 mg by mouth every 4 hours as needed for moderate pain        loratadine 10 MG tablet    CLARITIN     Take 10 mg by mouth daily        traMADol 50 MG tablet    ULTRAM    12  tablet    Take 1 tablet (50 mg) by mouth every 6 hours as needed for breakthrough pain maximum 3 tablet(s) per day    Costochondral chest pain

## 2018-02-21 ENCOUNTER — THERAPY VISIT (OUTPATIENT)
Dept: PHYSICAL THERAPY | Facility: CLINIC | Age: 48
End: 2018-02-21
Payer: COMMERCIAL

## 2018-02-21 DIAGNOSIS — M25.562 LEFT KNEE PAIN: Primary | ICD-10-CM

## 2018-02-21 PROCEDURE — 97161 PT EVAL LOW COMPLEX 20 MIN: CPT | Mod: GP | Performed by: PHYSICAL THERAPIST

## 2018-02-21 PROCEDURE — 97110 THERAPEUTIC EXERCISES: CPT | Mod: GP | Performed by: PHYSICAL THERAPIST

## 2018-02-21 ASSESSMENT — ACTIVITIES OF DAILY LIVING (ADL)
KNEE_ACTIVITY_OF_DAILY_LIVING_SCORE: 45.71
SIT WITH YOUR KNEE BENT: ACTIVITY IS SOMEWHAT DIFFICULT
GIVING WAY, BUCKLING OR SHIFTING OF KNEE: THE SYMPTOM AFFECTS MY ACTIVITY MODERATELY
KNEE_ACTIVITY_OF_DAILY_LIVING_SUM: 32
GO DOWN STAIRS: ACTIVITY IS SOMEWHAT DIFFICULT
KNEEL ON THE FRONT OF YOUR KNEE: I AM UNABLE TO DO THE ACTIVITY
PAIN: THE SYMPTOM AFFECTS MY ACTIVITY MODERATELY
SQUAT: I AM UNABLE TO DO THE ACTIVITY
RISE FROM A CHAIR: ACTIVITY IS VERY DIFFICULT
WALK: ACTIVITY IS MINIMALLY DIFFICULT
STIFFNESS: THE SYMPTOM AFFECTS MY ACTIVITY SLIGHTLY
STAND: ACTIVITY IS MINIMALLY DIFFICULT
WEAKNESS: THE SYMPTOM AFFECTS MY ACTIVITY MODERATELY
SWELLING: I HAVE THE SYMPTOM BUT IT DOES NOT AFFECT MY ACTIVITY
LIMPING: THE SYMPTOM AFFECTS MY ACTIVITY MODERATELY
RAW_SCORE: 32
GO UP STAIRS: ACTIVITY IS FAIRLY DIFFICULT

## 2018-02-21 NOTE — MR AVS SNAPSHOT
After Visit Summary   2/21/2018    Jayce Myers    MRN: [id number]           Patient Information     Date Of Birth          1970        Visit Information        Provider Department      2/21/2018 3:20 PM Afshin Stephens PT [facility] [place]        Today's Diagnoses     Left knee pain    -  1       Follow-ups after your visit        Your next 10 appointments already scheduled     Feb 28, 2018  3:20 PM CST   RAFAELA Extremity with Afshin Stephens PT   [facility] [facility] [place] [facility]    [facility] [facility] [id number] [phone]              Who to contact     If you have questions or need follow up information about today's clinic visit or your schedule please contact [facility] directly at [phone].  Normal or non-critical lab and imaging results will be communicated to you by Cap Thathart, letter or phone within 4 business days after the clinic has received the results. If you do not hear from us within 7 days, please contact the clinic through Cap Thathart or phone. If you have a critical or abnormal lab result, we will notify you by phone as soon as possible.  Submit refill requests through Zerply or call your pharmacy and they will forward the refill request to us. Please allow 3 business days for your refill to be completed.          Additional Information About Your Visit        MyChart Information     Zerply gives you secure access to your electronic health record. If you see a primary care provider, you can also send messages to your care team and make appointments. If you have questions, please call your primary care clinic.  If you do not have a primary care provider, please call [phone] and they will assist you.        Care EveryWhere ID     This is your Care EveryWhere ID. This could be used by other organizations to access your [place] medical records  [id number]          Blood Pressure from Last 3 Encounters:   02/12/18 124/78   01/26/18 114/78   12/06/17 132/84    Weight from Last 3 Encounters:   02/12/18 82 kg (180 lb 11.2 oz)   01/26/18 80.6 kg (177 lb 9.6 oz)   12/06/17 79.9 kg (176 lb 3.2 oz)              We Performed the Following     HC PT EVAL, LOW COMPLEXITY     RAFAELA INITIAL EVAL REPORT     THERAPEUTIC EXERCISES        Primary Care Provider Office Phone # Fax #    Chapo Maldonado [phone]       [address] [place] [address]  [facility] [address]        Equal Access to Services     CONSTANCE FINE : Hadii kathryn Quispe, waevy rae, qaybta kaalmada edenilson, mimi vazquez. So [facility] [phone].    ATENCIÓN: Si habla español, tiene a perdomo disposición servicios gratuitos de asistencia lingüística. Llame al [phone].    We comply with applicable federal civil rights laws and [place] laws. We do not discriminate on the basis of race, color, national origin, age, disability, sex, sexual orientation, or gender identity.            Thank you!     Thank you for choosing [facility] [place]  for your care. Our goal is always to provide you with excellent care. Hearing back from our patients is one way we can continue to improve our services. Please take a few minutes to complete the written survey that you may receive in the mail after your visit with us. Thank you!             Your Updated Medication List - Protect others around you: Learn how to safely use, store and throw away your medicines at www.disposemymeds.org.          This list is accurate as of 2/21/18  5:03 PM.  Always use your most recent med list.                   Brand Name Dispense Instructions for use Diagnosis    hydrocortisone valerate 0.2 % ointment    WEST-JAYDON    15 g    Apply sparingly to affected area three times daily for 14 days.    Irritant contact dermatitis due to drug in contact with skin       IBUPROFEN PO       Take 400 mg by mouth every 4 hours as needed for moderate pain        loratadine 10 MG tablet    CLARITIN     Take 10 mg by mouth daily        traMADol 50 MG tablet    ULTRAM    12 tablet    Take 1 tablet (50 mg) by mouth every 6 hours as needed for breakthrough pain maximum 3 tablet(s) per day    Costochondral chest pain

## 2018-02-21 NOTE — PROGRESS NOTES
[facility] Initial Evaluation  Jayce Myers is a 47 year old  male referred to physical therapy by Dr. Briggs for treatment of left knee pain with Precautions/Restrictions/MD instructions eval and treat     Physical Therapy Initial Evaluation: Subjective History      Injury/Condition Details:  Presenting Complaint Left knee pain   Onset Timing/Date February 2018   Mechanism Pulling a truck kevin (deborah) in which patient pulled the piece of machinery too quickly. Pt then tried to stop the deborah which twisted knee ultimately resulting in pain      Symptom Behavior Details    Primary Pain Symptoms Location: Left medial knee  Quality: ache sometimes sharp   Frequency: constant   Worst Pain 7/10   Best Pain 3/10   Symptom Provocators Walking, squatting, stairs   Symptom Relievers Rest, inactivity, ice   Time of day dependent? No   Recent symptom change? Small improvement since onset      Prior Testing/Intervention for current condition:  Prior Tests X-ray of left knee negative for fracture   Prior Treatment None      Lifestyle & General Medical History:  General Health Reported by Patient good   Employment    Usual physical activities  (within past year) Walking   Orthopaedic history Neck pain 2016   Notable medical history None     KNEE EVALUATION    Range of motion:  Knee ROM    Left 0-0-140   Right 0-0-140     Hip and Knee Strength  Hip MMT Flex Ext Abd Add   Left 4 4 4- 4   Right 4+ 4+ 4+ 4+   Knee MMT Ext Flex   Left 4 4+   Right 5 5       Basic quadriceps muscle activation:   Left: Fair Right: good    Knee ligaments and meniscus screen: Mild laxity with valgus stress test; All other meniscus and ligamentous tests negative  Patellofemoral screen: Unremarkable  Palpation: Moderate joint line tenderness and along MCL. Otherwise unremarkable     HPI                    Objective:  System    Physical Exam    General     ROS    Assessment/Plan:    Patient is a 47 year old male with left  side knee complaints.    Patient has the following significant findings with corresponding treatment plan.                Diagnosis 1:  Left knee pain  Pain -  manual therapy, splint/taping/bracing/orthotics, self management, education and home program  Decreased strength - therapeutic exercise, therapeutic activities and home program  Impaired balance - neuro re-education, therapeutic activities and home program  Decreased proprioception - neuro re-education, therapeutic activities and home program  Impaired gait - gait training and home program  Impaired muscle performance - neuro re-education and home program    Therapy Evaluation Codes:   1) History comprised of:   Personal factors that impact the plan of care:      None.    Comorbidity factors that impact the plan of care are:      None.     Medications impacting care: None.  2) Examination of Body Systems comprised of:   Body structures and functions that impact the plan of care:      Knee.   Activity limitations that impact the plan of care are:      Lifting, Squatting/kneeling, Stairs, Walking and Working.  3) Clinical presentation characteristics are:   Stable/Uncomplicated.  4) Decision-Making    Low complexity using standardized patient assessment instrument and/or measureable assessment of functional outcome.  Cumulative Therapy Evaluation is: Low complexity.    Previous and current functional limitations:  (See Goal Flow Sheet for this information)    Short term and Long term goals: (See Goal Flow Sheet for this information)     Communication ability:  Patient appears to be able to clearly communicate and understand verbal and written communication and follow directions correctly.  Treatment Explanation - The following has been discussed with the patient:   RX ordered/plan of care  Anticipated outcomes  Possible risks and side effects  This patient would benefit from PT intervention to resume normal activities.   Rehab potential is good.    Frequency:   1 X week, once daily  Duration:  for 6 weeks  Discharge Plan:  Achieve all LTG.  Independent in home treatment program.  Reach maximal therapeutic benefit.    Please refer to the daily flowsheet for treatment today, total treatment time and time spent performing 1:1 timed codes.

## 2018-02-21 NOTE — LETTER
[facility] ATHLETIC [place] [address] [facility] [address]  [phone]    2018    Re: Jayce Myers   :   1970  MRN:  [id number]   REFERRING PHYSICIAN:  Stephen Alvarado     [facility] ATHLETIC [facility], [place], operated [facility]    Date of Initial Evaluation: 18  Visits:  Rxs Used: 1  Reason for Referral:  Left knee pain    EVALUATION SUMMARY    [facility]  Jayce Myers is a 47 year old  male referred to physical therapy by Dr. Briggs for treatment of left knee pain with Precautions/Restrictions/MD instructions eval and treat     Physical Therapy Initial Evaluation: Subjective History      Injury/Condition Details:  Presenting Complaint Left knee pain   Onset Timing/Date 2018   Mechanism Pulling a truck kevin (deborah) in which patient pulled the piece of machinery too quickly. Pt then tried to stop the deborah which twisted knee ultimately resulting in pain      Symptom Behavior Details    Primary Pain Symptoms Location: Left medial knee  Quality: ache sometimes sharp   Frequency: constant   Worst Pain 7/10   Best Pain 3/10   Symptom Provocators Walking, squatting, stairs   Symptom Relievers Rest, inactivity, ice   Time of day dependent? No   Recent symptom change? Small improvement since onset      Prior Testing/Intervention for current condition:  Prior Tests X-ray of left knee negative for fracture   Prior Treatment None      Lifestyle & General Medical History:  General Health Reported by Patient good   Employment    Usual physical activities  (within past year) Walking   Orthopaedic history Neck pain 2016   Notable medical history None     KNEE EVALUATION    Range of motion:  Knee ROM    Left 0-0-140   Right 0-0-140     Hip and Knee Strength  Hip MMT Flex Ext Abd Add   Left 4 4 4- 4   Right 4+ 4+ 4+ 4+   Knee MMT Ext Flex   Left 4 4+   Right 5 5       Basic quadriceps muscle activation:   Left:  Fair Right: good  Knee ligaments and meniscus screen: Mild laxity with valgus stress test; All other meniscus and ligamentous tests negativePatellofemoral screen: UnremarkablePalpation: Moderate joint line tenderness and along MCL. Otherwise unremarkable     HPI                  Objective:  System  Physical Exam  General     ROS    Assessment/Plan:    Patient is a 47 year old male with left side knee complaints.    Patient has the following significant findings with corresponding treatment plan.                Diagnosis 1:  Left knee pain  Pain -  manual therapy, splint/taping/bracing/orthotics, self management, education and home program  Decreased strength - therapeutic exercise, therapeutic activities and home program  Impaired balance - neuro re-education, therapeutic activities and home program  Decreased proprioception - neuro re-education, therapeutic activities and home program  Impaired gait - gait training and home program  Impaired muscle performance - neuro re-education and home program    Therapy Evaluation Codes:   1) History comprised of:   Personal factors that impact the plan of care:      None.    Comorbidity factors that impact the plan of care are:      None.     Medications impacting care: None.  2) Examination of Body Systems comprised of:   Body structures and functions that impact the plan of care:      Knee.   Activity limitations that impact the plan of care are:      Lifting, Squatting/kneeling, Stairs, Walking and Working.  3) Clinical presentation characteristics are:   Stable/Uncomplicated.  4) Decision-Making    Low complexity using standardized patient assessment instrument and/or measureable assessment of functional outcome.  Cumulative Therapy Evaluation is: Low complexity.    Previous and current functional limitations:  (See Goal Flow Sheet for this information)    Short term and Long term goals: (See Goal Flow Sheet for this information)     Communication ability:  Patient  appears to be able to clearly communicate and understand verbal and written communication and follow directions correctly.  Treatment Explanation - The following has been discussed with the patient:   RX ordered/plan of care  Anticipated outcomes  Possible risks and side effects  This patient would benefit from PT intervention to resume normal activities.   Rehab potential is good.    Frequency:  1 X week, once daily  Duration:  for 6 weeks  Discharge Plan:  Achieve all LTG.  Independent in home treatment program.  Reach maximal therapeutic benefit.        Thank you for your referral.    INQUIRIES  Therapist: Esequiel Stephens PT   [facility] [address] [address]  Phone: [phone]  Fax: [phone]

## 2018-02-28 ENCOUNTER — THERAPY VISIT (OUTPATIENT)
Dept: PHYSICAL THERAPY | Facility: CLINIC | Age: 48
End: 2018-02-28
Payer: COMMERCIAL

## 2018-02-28 DIAGNOSIS — M25.562 LEFT KNEE PAIN: ICD-10-CM

## 2018-02-28 PROCEDURE — 97110 THERAPEUTIC EXERCISES: CPT | Mod: GP | Performed by: PHYSICAL THERAPIST

## 2018-02-28 PROCEDURE — 97530 THERAPEUTIC ACTIVITIES: CPT | Mod: GP | Performed by: PHYSICAL THERAPIST

## 2018-02-28 PROCEDURE — 97112 NEUROMUSCULAR REEDUCATION: CPT | Mod: GP | Performed by: PHYSICAL THERAPIST

## 2018-03-07 ENCOUNTER — THERAPY VISIT (OUTPATIENT)
Dept: PHYSICAL THERAPY | Facility: CLINIC | Age: 48
End: 2018-03-07
Payer: COMMERCIAL

## 2018-03-07 DIAGNOSIS — M25.562 LEFT KNEE PAIN: ICD-10-CM

## 2018-03-07 PROCEDURE — 97530 THERAPEUTIC ACTIVITIES: CPT | Mod: GP | Performed by: PHYSICAL THERAPIST

## 2018-03-07 PROCEDURE — 97112 NEUROMUSCULAR REEDUCATION: CPT | Mod: GP | Performed by: PHYSICAL THERAPIST

## 2018-03-07 PROCEDURE — 97110 THERAPEUTIC EXERCISES: CPT | Mod: GP | Performed by: PHYSICAL THERAPIST

## 2018-03-19 ENCOUNTER — THERAPY VISIT (OUTPATIENT)
Dept: PHYSICAL THERAPY | Facility: CLINIC | Age: 48
End: 2018-03-19
Payer: COMMERCIAL

## 2018-03-19 DIAGNOSIS — M25.562 LEFT KNEE PAIN: ICD-10-CM

## 2018-03-19 PROCEDURE — 97110 THERAPEUTIC EXERCISES: CPT | Mod: GP | Performed by: PHYSICAL THERAPIST

## 2018-03-19 PROCEDURE — 97530 THERAPEUTIC ACTIVITIES: CPT | Mod: GP | Performed by: PHYSICAL THERAPIST

## 2018-03-19 PROCEDURE — 97112 NEUROMUSCULAR REEDUCATION: CPT | Mod: GP | Performed by: PHYSICAL THERAPIST

## 2018-03-19 ASSESSMENT — ACTIVITIES OF DAILY LIVING (ADL)
GO DOWN STAIRS: ACTIVITY IS MINIMALLY DIFFICULT
WALK: ACTIVITY IS NOT DIFFICULT
LIMPING: I DO NOT HAVE THE SYMPTOM
GIVING WAY, BUCKLING OR SHIFTING OF KNEE: I DO NOT HAVE THE SYMPTOM
GO UP STAIRS: ACTIVITY IS MINIMALLY DIFFICULT
KNEE_ACTIVITY_OF_DAILY_LIVING_SUM: 59
AS_A_RESULT_OF_YOUR_KNEE_INJURY,_HOW_WOULD_YOU_RATE_YOUR_CURRENT_LEVEL_OF_DAILY_ACTIVITY?: NEARLY NORMAL
SIT WITH YOUR KNEE BENT: ACTIVITY IS NOT DIFFICULT
RAW_SCORE: 59
SQUAT: ACTIVITY IS SOMEWHAT DIFFICULT
PAIN: I HAVE THE SYMPTOM BUT IT DOES NOT AFFECT MY ACTIVITY
HOW_WOULD_YOU_RATE_THE_CURRENT_FUNCTION_OF_YOUR_KNEE_DURING_YOUR_USUAL_DAILY_ACTIVITIES_ON_A_SCALE_FROM_0_TO_100_WITH_100_BEING_YOUR_LEVEL_OF_KNEE_FUNCTION_PRIOR_TO_YOUR_INJURY_AND_0_BEING_THE_INABILITY_TO_PERFORM_ANY_OF_YOUR_USUAL_DAILY_ACTIVITIES?: 90
KNEE_ACTIVITY_OF_DAILY_LIVING_SCORE: 84.29
HOW_WOULD_YOU_RATE_THE_OVERALL_FUNCTION_OF_YOUR_KNEE_DURING_YOUR_USUAL_DAILY_ACTIVITIES?: NEARLY NORMAL
RISE FROM A CHAIR: ACTIVITY IS MINIMALLY DIFFICULT
STIFFNESS: THE SYMPTOM AFFECTS MY ACTIVITY SLIGHTLY
WEAKNESS: THE SYMPTOM AFFECTS MY ACTIVITY SLIGHTLY
STAND: ACTIVITY IS NOT DIFFICULT
KNEEL ON THE FRONT OF YOUR KNEE: ACTIVITY IS MINIMALLY DIFFICULT
SWELLING: I DO NOT HAVE THE SYMPTOM

## 2018-03-21 ENCOUNTER — THERAPY VISIT (OUTPATIENT)
Dept: PHYSICAL THERAPY | Facility: CLINIC | Age: 48
End: 2018-03-21
Payer: COMMERCIAL

## 2018-03-21 DIAGNOSIS — M25.562 LEFT KNEE PAIN: ICD-10-CM

## 2018-03-21 PROCEDURE — 97110 THERAPEUTIC EXERCISES: CPT | Mod: GP | Performed by: PHYSICAL THERAPIST

## 2018-03-21 PROCEDURE — 97112 NEUROMUSCULAR REEDUCATION: CPT | Mod: GP | Performed by: PHYSICAL THERAPIST

## 2018-03-21 PROCEDURE — 97530 THERAPEUTIC ACTIVITIES: CPT | Mod: GP | Performed by: PHYSICAL THERAPIST

## 2018-06-30 ENCOUNTER — HOSPITAL ENCOUNTER (EMERGENCY)
Facility: CLINIC | Age: 48
Discharge: HOME | End: 2018-06-30
Attending: EMERGENCY MEDICINE | Admitting: EMERGENCY MEDICINE
Payer: COMMERCIAL

## 2018-06-30 ENCOUNTER — APPOINTMENT (OUTPATIENT)
Dept: CT IMAGING | Facility: CLINIC | Age: 48
End: 2018-06-30
Attending: EMERGENCY MEDICINE
Payer: COMMERCIAL

## 2018-06-30 VITALS
DIASTOLIC BLOOD PRESSURE: 83 MMHG | SYSTOLIC BLOOD PRESSURE: 119 MMHG | TEMPERATURE: 97.6 F | HEART RATE: 92 BPM | OXYGEN SATURATION: 98 % | RESPIRATION RATE: 16 BRPM

## 2018-06-30 DIAGNOSIS — J02.9 ACUTE PHARYNGITIS: ICD-10-CM

## 2018-06-30 DIAGNOSIS — R07.0 THROAT PAIN: ICD-10-CM

## 2018-06-30 LAB
ANION GAP SERPL CALCULATED.3IONS-SCNC: 12 MMOL/L (ref 3–14)
BASOPHILS # BLD AUTO: 0 10E9/L (ref 0–0.2)
BASOPHILS NFR BLD AUTO: 0.3 %
BUN SERPL-MCNC: 17 MG/DL (ref 7–30)
CALCIUM SERPL-MCNC: 8.7 MG/DL (ref 8.5–10.1)
CHLORIDE SERPL-SCNC: 107 MMOL/L (ref 94–109)
CO2 SERPL-SCNC: 24 MMOL/L (ref 20–32)
CREAT SERPL-MCNC: 1.07 MG/DL (ref 0.66–1.25)
DIFFERENTIAL METHOD BLD: NORMAL
EOSINOPHIL # BLD AUTO: 0.2 10E9/L (ref 0–0.7)
EOSINOPHIL NFR BLD AUTO: 3.7 %
ERYTHROCYTE [DISTWIDTH] IN BLOOD BY AUTOMATED COUNT: 13.3 % (ref 10–15)
GFR SERPL CREATININE-BSD FRML MDRD: 73 ML/MIN/1.7M2
GLUCOSE SERPL-MCNC: 91 MG/DL (ref 70–99)
HCT VFR BLD AUTO: 44.9 % (ref 40–53)
HGB BLD-MCNC: 15.2 G/DL (ref 13.3–17.7)
IMM GRANULOCYTES # BLD: 0 10E9/L (ref 0–0.4)
IMM GRANULOCYTES NFR BLD: 0.2 %
LYMPHOCYTES # BLD AUTO: 2.3 10E9/L (ref 0.8–5.3)
LYMPHOCYTES NFR BLD AUTO: 35.3 %
MCH RBC QN AUTO: 30.7 PG (ref 26.5–33)
MCHC RBC AUTO-ENTMCNC: 33.9 G/DL (ref 31.5–36.5)
MCV RBC AUTO: 91 FL (ref 78–100)
MONOCYTES # BLD AUTO: 0.5 10E9/L (ref 0–1.3)
MONOCYTES NFR BLD AUTO: 7 %
NEUTROPHILS # BLD AUTO: 3.4 10E9/L (ref 1.6–8.3)
NEUTROPHILS NFR BLD AUTO: 53.5 %
NRBC # BLD AUTO: 0 10*3/UL
NRBC BLD AUTO-RTO: 0 /100
PLATELET # BLD AUTO: 252 10E9/L (ref 150–450)
POTASSIUM SERPL-SCNC: 3.7 MMOL/L (ref 3.4–5.3)
RBC # BLD AUTO: 4.95 10E12/L (ref 4.4–5.9)
SODIUM SERPL-SCNC: 143 MMOL/L (ref 133–144)
WBC # BLD AUTO: 6.4 10E9/L (ref 4–11)

## 2018-06-30 PROCEDURE — 25000125 ZZHC RX 250: Performed by: EMERGENCY MEDICINE

## 2018-06-30 PROCEDURE — 99285 EMERGENCY DEPT VISIT HI MDM: CPT | Mod: 25

## 2018-06-30 PROCEDURE — 70491 CT SOFT TISSUE NECK W/DYE: CPT

## 2018-06-30 PROCEDURE — 96374 THER/PROPH/DIAG INJ IV PUSH: CPT

## 2018-06-30 PROCEDURE — 25000128 H RX IP 250 OP 636: Performed by: EMERGENCY MEDICINE

## 2018-06-30 PROCEDURE — 96375 TX/PRO/DX INJ NEW DRUG ADDON: CPT

## 2018-06-30 PROCEDURE — 80048 BASIC METABOLIC PNL TOTAL CA: CPT | Performed by: EMERGENCY MEDICINE

## 2018-06-30 PROCEDURE — 99284 EMERGENCY DEPT VISIT MOD MDM: CPT | Mod: Z6 | Performed by: EMERGENCY MEDICINE

## 2018-06-30 PROCEDURE — 85025 COMPLETE CBC W/AUTO DIFF WBC: CPT | Performed by: EMERGENCY MEDICINE

## 2018-06-30 RX ORDER — IOPAMIDOL 755 MG/ML
100 INJECTION, SOLUTION INTRAVASCULAR ONCE
Status: COMPLETED | OUTPATIENT
Start: 2018-06-30 | End: 2018-06-30

## 2018-06-30 RX ORDER — DIPHENHYDRAMINE HYDROCHLORIDE 50 MG/ML
50 INJECTION INTRAMUSCULAR; INTRAVENOUS ONCE
Status: COMPLETED | OUTPATIENT
Start: 2018-06-30 | End: 2018-06-30

## 2018-06-30 RX ORDER — KETOROLAC TROMETHAMINE 30 MG/ML
30 INJECTION, SOLUTION INTRAMUSCULAR; INTRAVENOUS ONCE
Status: COMPLETED | OUTPATIENT
Start: 2018-06-30 | End: 2018-06-30

## 2018-06-30 RX ADMIN — KETOROLAC TROMETHAMINE 30 MG: 30 INJECTION, SOLUTION INTRAMUSCULAR at 18:39

## 2018-06-30 RX ADMIN — SODIUM CHLORIDE 50 ML: 9 INJECTION, SOLUTION INTRAVENOUS at 19:49

## 2018-06-30 RX ADMIN — DIPHENHYDRAMINE HYDROCHLORIDE 50 MG: 50 INJECTION, SOLUTION INTRAMUSCULAR; INTRAVENOUS at 18:39

## 2018-06-30 RX ADMIN — IOPAMIDOL 80 ML: 755 INJECTION, SOLUTION INTRAVENOUS at 19:48

## 2018-06-30 ASSESSMENT — ENCOUNTER SYMPTOMS
RHINORRHEA: 0
DIARRHEA: 0
COUGH: 0
SHORTNESS OF BREATH: 0
ABDOMINAL PAIN: 0
NAUSEA: 0
FACIAL SWELLING: 0
VOMITING: 0
NERVOUS/ANXIOUS: 1

## 2018-06-30 NOTE — ED PROVIDER NOTES
"    History     Chief Complaint   Patient presents with     Allergic Reaction     reports allergic reaction to possible stone fruit, feels like throat is swelling, difficulty talking     HPI  Jayce Myers is a 48 year old male with a history of stone fruit allergies who presents to the ED with sore throat. Patient states 25 minutes prior to arrival, he was walking outside and developed acute, onset pain at his \"keenan's apple\". He states he felt like \"someone karate chopped\" it. He states he has never had this happen before and also complains of some tongue numbness after chewing and spitting out a tablet of Benadryl. He reports 2 hours earlier he did have a sore throat but felt this was due to being in the heat and A/C. He also states he drank a new beer that had 5% black cherry juice in it.  He states he chewed a tablet of Benadryl, but he spat it out. He also states he has left sided neck soreness that has been ongoing for weeks. He otherwise currently denies any facial swelling, rash, shortness of breath or difficulty breathing, chest pain, cough, abdominal pain, nausea, vomiting, diarrhea, rhinorrhea, congestion, new medications, or other medical problems. He does report having some anxiety.     PAST MEDICAL HISTORY  Past Medical History:   Diagnosis Date     Nevus     left leg      PAST SURGICAL HISTORY  History reviewed. No pertinent surgical history.  FAMILY HISTORY  Family History   Problem Relation Age of Onset     Cancer Mother      salivary gland?     Thyroid Disease Mother      Diabetes Mother      DM2     Anemia Sister      Jayce ROLON does not known cause     Cancer - colorectal No family hx of      SOCIAL HISTORY  Social History   Substance Use Topics     Smoking status: Former Smoker     Packs/day: 0.50     Years: 3.00     Types: Cigarettes     Quit date: 1/26/2003     Smokeless tobacco: Never Used      Comment: social smoking     Alcohol use Yes      Comment: social     MEDICATIONS  No current " facility-administered medications for this encounter.      Current Outpatient Prescriptions   Medication     Multiple Vitamins-Minerals (CENTRUM ADULTS PO)     ALLERGIES  Allergies   Allergen Reactions     Pollen Extract Itching     Hayfever       I have reviewed the Medications, Allergies, Past Medical and Surgical History, and Social History in the Epic system.    Review of Systems   HENT: Negative for congestion, facial swelling and rhinorrhea.         Positive for pain around his Emmanuel's apple   Respiratory: Negative for cough and shortness of breath.    Cardiovascular: Negative for chest pain.   Gastrointestinal: Negative for abdominal pain, diarrhea, nausea and vomiting.   Skin: Negative for rash.   Psychiatric/Behavioral: The patient is nervous/anxious.    All other systems reviewed and are negative.      Physical Exam     ED Triage Vitals   Enc Vitals Group      BP 06/30/18 1824 134/81      Pulse 06/30/18 1824 92      Resp 06/30/18 1824 16      Temp 06/30/18 1824 98.4  F (36.9  C)      Temp src 06/30/18 1824 Oral      SpO2 06/30/18 1824 100 %      Weight --          Physical Exam  GEN:  Well developed, no acute distress  HEENT:  EOMI, Mucous membranes are moist.  Posterior pharynx has minimal erythema.  The uvula appears slightly swollen.  There is no tonsillar exudate or tonsillar swelling.  There is no stridor.  Cardio:  RRR, no murmur, radial pulses equal bilaterally  PULM:  Lungs clear, good air movement, no wheezes, rales  Abd:  Soft, normal bowel sounds, no focal tenderness  Musculoskeletal:  normal range of motion, no lower extremity swelling or calf tenderness  Neuro:  Alert and oriented X3, Follows commands, moving all extremities spontaneously   Skin:  Warm, dry    ED Course     ED Course     Procedures   6:24 PM  The patient was seen and examined by Dr. Ramos in Room 3.                Critical Care time:  none  Patient was given IV Benadryl for possible allergic reaction and IV Toradol for his  throat pain.  He had improvement in his symptoms.  Patient was able to eat crackers and swallow without difficulty prior to discharge.  CT scan of the soft tissues of the neck was done to evaluate for possible posterior pharyngeal abscess or other cause of pain.  Results are shown here:  Results for orders placed or performed during the hospital encounter of 06/30/18   Soft tissue neck CT w contrast    Narrative    CT SCAN OF THE NECK WITH CONTRAST  6/30/2018 7:54 PM     HISTORY: throat pain, eval for abscess or other cause of pain;     TECHNIQUE:  Axial images and coronal reformations. Radiation dose for  this scan was reduced using automated exposure control, adjustment of  the mA and/or kV according to patient size, or iterative  reconstruction technique. 80 ml Isovue 370 IV.    COMPARISON: None.    FINDINGS:  Visualized sinuses, nasopharynx and orbits: Mucosal thickening is  present in multiple ethmoid sinuses.  Coastal thickening is also seen  in the maxillary sinuses, right greater than left.    Tongue, oral cavity and oropharynx:  The palatine tonsils are normal  in size. No tonsillar or peritonsillar abscess is seen.      Hypopharynx: Normal.      Larynx and trachea: Normal.      Thyroid: Normal.    Submandibular glands: Normal.      Parotid glands: Normal.        Lymph nodes: Normal.      Vasculature: Normal.      Upper mediastinum and lungs: Normal.      Bones: Normal.    Miscellaneous: Longus colli muscles are normal. The styloid processes  are normal in length.      Impression    IMPRESSION:     1. No acute pathology is identified. No tonsillar or peritonsillar  abscess is seen.  2. Mucosal thickening is seen in the maxillary sinuses and multiple  ethmoid sinuses.    PAOLO NEFF MD            Labs are normal except as shown.    Labs Ordered and Resulted from Time of ED Arrival Up to the Time of Departure from the ED   CBC WITH PLATELETS DIFFERENTIAL   BASIC METABOLIC PANEL            Assessments & Plan  (with Medical Decision Making)   Patient presents with acute onset throat pain without any neck swelling or facial swelling or any sign of airway compromise.  He does have some mild erythema on his posterior pharynx, but no exudate.  CT scan of the soft tissues of the neck is unremarkable.  There is no peritonsillar abscess or airway compromise.  Patient is concerned that his throat pain may have been an allergic response to a drink that contained black tarry juice.  Significant pain is not a common allergic symptom, however it is possible that he had a localized reaction to the black cherry juice.  I advised that he continue taking Benadryl and ibuprofen and return to the emergency department if he has any new or worsening symptoms or other concerns.  He was discharged home with his wife.    I have reviewed the nursing notes.    I have reviewed the findings, diagnosis, plan and need for follow up with the patient.    New Prescriptions    No medications on file       Final diagnoses:   Throat pain     Gerard MORFIN, am serving as a trained medical scribe to document services personally performed by Afsaneh Ramos MD, based on the provider's statements to me.      Afsaneh MORFIN MD, was physically present and have reviewed and verified the accuracy of this note documented by Gerard Grimaldo.      6/30/2018   [facility], [place], EMERGENCY DEPARTMENT     Afsaneh Ramos MD  06/30/18 2043

## 2018-06-30 NOTE — ED AVS SNAPSHOT
[facility], [place], Emergency Department    [address]    [facility] [address]    Phone:  [phone]    Fax:  [phone]                                       Jayce Myers   MRN: [id number]    Department:  [facility], Emergency Department   Date of Visit:  6/30/2018           After Visit Summary Signature Page     I have received my discharge instructions, and my questions have been answered. I have discussed any challenges I see with this plan with the nurse or doctor.    ..........................................................................................................................................  Patient/Patient Representative Signature      ..........................................................................................................................................  Patient Representative Print Name and Relationship to Patient    ..................................................               ................................................  Date                                            Time    ..........................................................................................................................................  Reviewed by Signature/Title    ...................................................              ..............................................  Date                                                            Time

## 2018-06-30 NOTE — ED AVS SNAPSHOT
[facility], Emergency Department    [address]    [place] [address]    Phone:  [phone]    Fax:  [phone]                                       Jayce Myers   MRN: [id number]    Department:  [facility], Emergency Department   Date of Visit:  6/30/2018           Patient Information     Date Of Birth          1970        Your diagnoses for this visit were:     Throat pain        You were seen by Afsaneh Ramos MD.        Discharge Instructions       Continue to take Benadryl every 4-6 hours until the symptoms improved.  You may also take ibuprofen for pain.  Return to the emergency department if you have worsening pain, any difficulty breathing, facial swelling, rash  Or any other concerns.    24 Hour Appointment Hotline       To make an appointment at any [facility] clinic, call [phone] ([phone]). If you don't have a family doctor or clinic, we will help you find one. [facility] clinics are conveniently located to serve the needs of you and your family.             Review of your medicines      Our records show that you are taking the medicines listed below. If these are incorrect, please call your family doctor or clinic.        Dose / Directions Last dose taken    CENTRUM ADULTS PO        Refills:  0                Procedures and tests performed during your visit     Basic metabolic panel    CBC with platelets differential    Soft tissue neck CT w contrast      Orders Needing Specimen Collection     None      Pending Results     No orders found from 6/28/2018 to 7/1/2018.            Pending Culture Results     No orders found from 6/28/2018 to 7/1/2018.            Pending Results Instructions     If you had any lab results that were not finalized at the time of your Discharge, you can call the ED Lab Result RN at [phone]. You will be contacted by this team for any positive Lab results or changes in treatment. The nurses are available 7 days a week from [facility]  to 6:30P.  You can leave a message 24 hours per day and they will return your call.        Thank you for choosing [facility]       Thank you for choosing [facility] for your care. Our goal is always to provide you with excellent care. Hearing back from our patients is one way we can continue to improve our services. Please take a few minutes to complete the written survey that you may receive in the mail after you visit with us. Thank you!        [employer] Information     [employer] gives you secure access to your electronic health record. If you see a primary care provider, you can also send messages to your care team and make appointments. If you have questions, please call your primary care clinic.  If you do not have a primary care provider, please call [phone] and they will assist you.        Care EveryWhere ID     This is your Care EveryWhere ID. This could be used by other organizations to access your [facility] medical records  [id number]        Equal Access to Services     [employer] : Terrence Quispe, christophe rae, kar pyle, mimi briscoe . So [facility] [phone].    ATENCIÓN: Si habla español, tiene a perdomo disposición servicios gratuitos de asistencia lingüística. Llame al [phone].    We comply with applicable federal civil rights laws and [place] laws. We do not discriminate on the basis of race, color, national origin, age, disability, sex, sexual orientation, or gender identity.            After Visit Summary       This is your record. Keep this with you and show to your community pharmacist(s) and doctor(s) at your next visit.

## 2018-07-01 NOTE — DISCHARGE INSTRUCTIONS
Continue to take Benadryl every 4-6 hours until the symptoms improved.  You may also take ibuprofen for pain.  Return to the emergency department if you have worsening pain, any difficulty breathing, facial swelling, rash  Or any other concerns.

## 2018-07-03 ENCOUNTER — OFFICE VISIT (OUTPATIENT)
Dept: FAMILY MEDICINE | Facility: CLINIC | Age: 48
End: 2018-07-03
Payer: COMMERCIAL

## 2018-07-03 VITALS
OXYGEN SATURATION: 100 % | WEIGHT: 163.8 LBS | SYSTOLIC BLOOD PRESSURE: 118 MMHG | TEMPERATURE: 97.9 F | BODY MASS INDEX: 25.41 KG/M2 | HEART RATE: 80 BPM | DIASTOLIC BLOOD PRESSURE: 72 MMHG

## 2018-07-03 DIAGNOSIS — J06.9 VIRAL URI: ICD-10-CM

## 2018-07-03 DIAGNOSIS — S16.1XXA CERVICAL STRAIN, INITIAL ENCOUNTER: Primary | ICD-10-CM

## 2018-07-03 PROCEDURE — 99213 OFFICE O/P EST LOW 20 MIN: CPT | Performed by: FAMILY MEDICINE

## 2018-07-03 RX ORDER — CYCLOBENZAPRINE HCL 10 MG
10 TABLET ORAL 3 TIMES DAILY PRN
Qty: 15 TABLET | Refills: 0 | Status: SHIPPED | OUTPATIENT
Start: 2018-07-03 | End: 2019-11-12

## 2018-07-03 NOTE — MR AVS SNAPSHOT
After Visit Summary   7/3/2018    Jayce Myers    MRN: [id number]           Patient Information     Date Of Birth          1970        Visit Information        Provider Department      7/3/2018 4:45 PM Tommy Wilder MD [facility]        Today's Diagnoses     Cervical strain, initial encounter    -  1    Viral URI           Follow-ups after your visit        Who to contact     If you have questions or need follow up information about today's clinic visit or your schedule please contact [facility] directly at [phone].  Normal or non-critical lab and imaging results will be communicated to you by [employer], letter or phone within 4 business days after the clinic has received the results. If you do not hear from us within 7 days, please contact the clinic through [employer] or phone. If you have a critical or abnormal lab result, we will notify you by phone as soon as possible.  Submit refill requests through [employer] or call your pharmacy and they will forward the refill request to us. Please allow 3 business days for your refill to be completed.          Additional Information About Your Visit        MyChart Information     [employer] gives you secure access to your electronic health record. If you see a primary care provider, you can also send messages to your care team and make appointments. If you have questions, please call your primary care clinic.  If you do not have a primary care provider, please call [phone] and they will assist you.        Care EveryWhere ID     This is your Care EveryWhere ID. This could be used by other organizations to access your [facility] medical records  [id number]        Your Vitals Were     Pulse Temperature Pulse Oximetry BMI (Body Mass Index)          80 97.9  F (36.6  C) (Oral) 100% 25.41 kg/m2         Blood Pressure from Last 3 Encounters:   07/03/18 118/72   06/30/18 119/83   02/12/18 124/78    Weight from Last 3  Encounters:   07/03/18 163 lb 12.8 oz (74.3 kg)   02/12/18 180 lb 11.2 oz (82 kg)   01/26/18 177 lb 9.6 oz (80.6 kg)              Today, you had the following     No orders found for display         Today's Medication Changes          These changes are accurate as of 7/3/18  5:20 PM.  If you have any questions, ask your nurse or doctor.               Start taking these medicines.        Dose/Directions    cyclobenzaprine 10 MG tablet   Commonly known as:  FLEXERIL   Used for:  Cervical strain, initial encounter   Started by:  Tommy Wilder MD        Dose:  10 mg   Take 1 tablet (10 mg) by mouth 3 times daily as needed for muscle spasms   Quantity:  15 tablet   Refills:  0            Where to get your medicines      Some of these will need a paper prescription and others can be bought over the counter.  Ask your nurse if you have questions.     Bring a paper prescription for each of these medications     cyclobenzaprine 10 MG tablet                Primary Care Provider Office Phone # Fax #    Chapo Maldonado [phone]       [address] [address]        Equal Access to Services     [facility]: Hadii kathryn maldonado hadasho Sovalentina, waaxda luqadaha, qaybta kaalmada adeegyada, mimi briscoe . So [facility] [phone].    ATENCIÓN: Si habla español, tiene a perdomo disposición servicios gratuitos de asistencia lingüística. [facility] [phone].    We comply with applicable federal civil rights laws and [place] laws. We do not discriminate on the basis of race, color, national origin, age, disability, sex, sexual orientation, or gender identity.            Thank you!     Thank you for choosing [facility]  [place] your [place]. Our goal is always to provide you with excellent care. Hearing back from our patients is one way we can continue to improve our services. Please take a few minutes to complete the written survey that you may  receive in the mail after your visit with us. Thank you!             Your Updated Medication List - Protect others around you: Learn how to safely use, store and throw away your medicines at www.disposemymeds.org.          This list is accurate as of 7/3/18  5:20 PM.  Always use your most recent med list.                   Brand Name Dispense Instructions for use Diagnosis    CENTRUM ADULTS PO           cyclobenzaprine 10 MG tablet    FLEXERIL    15 tablet    Take 1 tablet (10 mg) by mouth 3 times daily as needed for muscle spasms    Cervical strain, initial encounter

## 2018-09-20 ENCOUNTER — ALLIED HEALTH/NURSE VISIT (OUTPATIENT)
Dept: NURSING | Facility: CLINIC | Age: 48
End: 2018-09-20
Payer: COMMERCIAL

## 2018-09-20 DIAGNOSIS — Z23 NEED FOR PROPHYLACTIC VACCINATION AND INOCULATION AGAINST INFLUENZA: Primary | ICD-10-CM

## 2018-09-20 PROCEDURE — 90471 IMMUNIZATION ADMIN: CPT

## 2018-09-20 PROCEDURE — 90686 IIV4 VACC NO PRSV 0.5 ML IM: CPT

## 2018-09-20 PROCEDURE — 99207 ZZC NO CHARGE NURSE ONLY: CPT

## 2018-09-20 NOTE — MR AVS SNAPSHOT
After Visit Summary   9/20/2018    Jayce Myers    MRN: [id number]           Patient Information     Date Of Birth          1970        Visit Information        Provider Department      9/20/2018 4:45 PM RD LAXMI MA/LPN [facility]        Today's Diagnoses     Need for prophylactic vaccination and inoculation against influenza    -  1       Follow-ups after your visit        Your next 10 appointments already scheduled     Sep 20, 2018  4:45 PM CDT   Nurse Only with RD LAXMI TOLEDO/LPN   [facility] ([facility])    [address] [place] [id number] [phone]              Who to contact     If you have questions or need follow up information about today's clinic visit or your schedule please contact [facility] directly at [phone].  Normal or non-critical lab and imaging results will be communicated to you by [employer], letter or phone within 4 business days after the clinic has received the results. If you do not hear from us within 7 days, please contact the clinic through [employer] or phone. If you have a critical or abnormal lab result, we will notify you by phone as soon as possible.  Submit refill requests through [employer] or call your pharmacy and they will forward the refill request to us. Please allow 3 business days for your refill to be completed.          Additional Information About Your Visit        MyChart Information     Transfer To gives you secure access to your electronic health record. If you see a primary care provider, you can also send messages to your care team and make appointments. If you have questions, please call your primary care clinic.  If you do not have a primary care provider, please call [phone] and they will assist you.        Care EveryWhere ID     This is your Care EveryWhere ID. This could be used by other organizations to access your [facility] medical records  [id number]          Blood Pressure from Last 3 Encounters:   07/03/18 118/72   06/30/18 119/83   02/12/18 124/78    Weight from Last 3 Encounters:   07/03/18 163 lb 12.8 oz (74.3 kg)   02/12/18 180 lb 11.2 oz (82 kg)   01/26/18 177 lb 9.6 oz (80.6 kg)              We Performed the Following     FLU VACCINE, SPLIT VIRUS, IM (QUADRIVALENT) [22540]- >3 YRS     Vaccine Administration, Initial [06085]        Primary Care Provider Office Phone # Fax #    Chapo Maldonado [phone]       [address] [facility] [address]  [facility] [address]        Equal Access to Services     CONSTANCE FINE : Hadii kathryn prescotto Sovalentina, waaxda luqadaha, qaybta kaalmada adeegyada, mimi briscoe . So [facility] [phone].    ATENCIÓN: Si habla español, tiene a perdomo disposición servicios gratuitos de asistencia lingüística. Anabell al [phone].    We comply with applicable federal civil rights laws and [place] laws. We do not discriminate on the basis of race, color, national origin, age, disability, sex, sexual orientation, or gender identity.            Thank you!     Thank you for choosing [facility]  for your care. Our goal is always to provide you with excellent care. Hearing back from our patients is one way we can continue to improve our services. Please take a few minutes to complete the written survey that you may receive in the mail after your visit with us. Thank you!             Your Updated Medication List - Protect others around you: Learn how to safely use, store and throw away your medicines at www.disposemymeds.org.          This list is accurate as of 9/20/18  4:17 PM.  Always use your most recent med list.                   Brand Name Dispense Instructions for use Diagnosis    CENTRUM ADULTS PO           cyclobenzaprine 10 MG tablet    FLEXERIL    15 tablet    Take 1 tablet (10 mg) by mouth 3 times daily as needed for muscle spasms    Cervical strain, initial encounter

## 2018-09-20 NOTE — PROGRESS NOTES

## 2019-08-21 ENCOUNTER — NURSE TRIAGE (OUTPATIENT)
Dept: NURSING | Facility: CLINIC | Age: 49
End: 2019-08-21

## 2019-08-22 ENCOUNTER — OFFICE VISIT (OUTPATIENT)
Dept: URGENT CARE | Facility: URGENT CARE | Age: 49
End: 2019-08-22
Payer: COMMERCIAL

## 2019-08-22 VITALS
RESPIRATION RATE: 12 BRPM | WEIGHT: 162.2 LBS | OXYGEN SATURATION: 98 % | BODY MASS INDEX: 25.16 KG/M2 | TEMPERATURE: 97.7 F | SYSTOLIC BLOOD PRESSURE: 122 MMHG | HEART RATE: 73 BPM | DIASTOLIC BLOOD PRESSURE: 74 MMHG

## 2019-08-22 DIAGNOSIS — H81.10 BENIGN PAROXYSMAL POSITIONAL VERTIGO, UNSPECIFIED LATERALITY: Primary | ICD-10-CM

## 2019-08-22 PROCEDURE — 99213 OFFICE O/P EST LOW 20 MIN: CPT | Performed by: PHYSICIAN ASSISTANT

## 2019-08-22 RX ORDER — MECLIZINE HYDROCHLORIDE 25 MG/1
25 TABLET ORAL 3 TIMES DAILY PRN
Qty: 30 TABLET | Refills: 0 | Status: SHIPPED | OUTPATIENT
Start: 2019-08-22 | End: 2019-09-01

## 2019-08-22 NOTE — PROGRESS NOTES
"SUBJECTIVE:  Jayce Myers is a 49 year old male who presents with right ear plugged for 1 month(s). Yesterday some dizziness started which he has had before. Popping sensation to the right ear as well.   Severity: mild   Timing:sudden onset of dizziness.   Additional symptoms include none.  He took benadryl last night.     History of recurrent otitis: history of vestibular neuronitis. \"falling over to the right\"    Past Medical History:   Diagnosis Date     Nevus     left leg      Current Outpatient Medications   Medication Sig Dispense Refill     Multiple Vitamins-Minerals (CENTRUM ADULTS PO)        cyclobenzaprine (FLEXERIL) 10 MG tablet Take 1 tablet (10 mg) by mouth 3 times daily as needed for muscle spasms (Patient not taking: Reported on 2019) 15 tablet 0     Social History     Tobacco Use     Smoking status: Former Smoker     Packs/day: 0.50     Years: 3.00     Pack years: 1.50     Types: Cigarettes     Last attempt to quit: 2003     Years since quittin.5     Smokeless tobacco: Never Used     Tobacco comment: social smoking   Substance Use Topics     Alcohol use: Yes     Comment: social       ROS:   CONSTITUTIONAL:NEGATIVE for fever, chills, change in weight  EYES: NEGATIVE for vision changes or irritation  ENT/MOUTH: ear pain right with some dizziness  RESP:NEGATIVE for significant cough or SOB    OBJECTIVE:  /74 (BP Location: Left arm, Patient Position: Chair, Cuff Size: Adult Regular)   Pulse 73   Temp 97.7  F (36.5  C) (Oral)   Resp 12   Wt 73.6 kg (162 lb 3.2 oz)   SpO2 98%   BMI 25.16 kg/m     EXAM:  The right TM is air/fluid interface and retracted     The right auditory canal is normal and without drainage, edema or erythema  The left TM is normal: no effusions, no erythema, and normal landmarks  The left auditory canal is normal and without drainage, edema or erythema  Oropharynx exam is normal: no lesions, erythema, adenopathy or exudate.  GENERAL: no acute " distress  EYES: EOMI,  PERRL, conjunctiva clear  NECK: supple, non-tender to palpation, no adenopathy noted  RESP: lungs clear to auscultation - no rales, rhonchi or wheezes  CV: regular rates and rhythm, normal S1 S2, no murmur noted  SKIN: no suspicious lesions or rashes     ASSESSMENT:    1. Benign paroxysmal positional vertigo, unspecified laterality  R/O and mild with contribution of right OM with effusion    - meclizine (ANTIVERT) 25 MG tablet; Take 1 tablet (25 mg) by mouth 3 times daily as needed for dizziness  Dispense: 30 tablet; Refill: 0    PLAN: monitor symptoms and follow up as needed.   See orders in Epic

## 2019-08-22 NOTE — TELEPHONE ENCOUNTER
"    Reason for Disposition    Decreased hearing (or another adult says that the ear canal is completely blocked with discharge)    [1] MODERATE dizziness (e.g., vertigo; feels very unsteady, interferes with normal activities) AND [2] has been evaluated by physician for this    Additional Information    Negative: Recently examined and diagnosed with \"Otitis Externa\" or \"Swimmer's Ear\"    Negative: [1] Stiff neck (unable to touch chin to chest) AND [2] fever    Negative: [1] Stiff neck (unable to touch chin to chest) AND [2] headache    Negative: Bony area of skull behind the ear is pink or swollen    Negative: Patient sounds very sick or weak to the triager    Negative: [1] SEVERE pain and [2] not improved 2 hours after analgesic medication (e.g., ibuprofen or acetaminophen)    Negative: Fever > 100.5 F (38.1 C)    Negative: Outer ear (ear lobe) is red and swollen    Negative: [1] Stiff neck (unable to touch chin to chest) AND [2] no fever or headache    Negative: Diabetes mellitus or weak immune system (e.g., HIV positive, cancer chemotherapy, transplant patient)    Negative: Yellow or green discharge from ear canal    Negative: [1] Weakness (i.e., paralysis, loss of muscle strength) of the face, arm or leg on one side of the body AND [2] sudden onset AND [3] present now    Negative: [1] Numbness (i.e., loss of sensation) of the face, arm or leg on one side of the body AND [2] sudden onset AND [3] present now    Negative: [1] Loss of speech or garbled speech AND [2] sudden onset AND [3] present now    Negative: Difficult to awaken or acting confused (e.g., disoriented, slurred speech)    Negative: Sounds like a life-threatening emergency to the triager    Negative: Followed a head injury    Negative: Followed an ear injury    Negative: Localized weakness or numbness is main symptom    Negative: Dizziness relates to riding in a car, going to an amusement park, etc.    Negative: [1] Dizziness is main symptom AND [2] " "NO spinning sensation (i.e., vertigo)    Negative: SEVERE dizziness (vertigo) (e.g., unable to walk without assistance)    Negative: [1] Dizziness (vertigo) present now AND [2] one or more stroke risk factors (i.e., hypertension, diabetes, prior stroke/TIA/heart attack)  (Exception: prior physician evaluation for this AND no different/worse than usual)    Negative: [1] Dizziness (vertigo) present now AND [2] age > 60  (Exception: prior physician evaluation for this AND no different/worse than usual)    Negative: Severe headache (e.g., excruciating)  (Exception: similar to previous migraines)    Negative: Patient sounds very sick or weak to the triager    Negative: Taking a medicine that could cause dizziness (e.g., phenytoin [Dilantin], carbamazepine [Tegretol], primidone [Mysoline])    Negative: Earache    Negative: Vomiting occurs with dizziness    Negative: [1] MODERATE dizziness (e.g., vertigo; feels very unsteady, interferes with normal activities) AND [2] has NOT been evaluated by physician for this    Protocols used: EAR - SWIMMER'S-A-AH, DIZZINESS - VERTIGO-A-AH    Patient's spouse had called on his behalf, but she was not present with the patient.  She requested that writer contact the patient at home.  Writer contacted the patient at home to triage his symptoms.  He reports dizziness with onset today after having been complaining of \"water in the ear\" over the past week.  Patient reports a possible gradual loss in hearing, but reports that he feels his eyes jumping and feeling unsteady.  He reports similar instances in the past due to foreign body in the ear.  Writer advised that patient be seen by a provider within 24 hours per guideline.  Patient reports he will have his spouse call back to schedule an appointment.    Fanny Stokes RN  [facility]    "

## 2019-08-26 ENCOUNTER — HOSPITAL ENCOUNTER (EMERGENCY)
Facility: CLINIC | Age: 49
Discharge: HOME | End: 2019-08-26
Attending: EMERGENCY MEDICINE | Admitting: EMERGENCY MEDICINE
Payer: COMMERCIAL

## 2019-08-26 ENCOUNTER — NURSE TRIAGE (OUTPATIENT)
Dept: NURSING | Facility: CLINIC | Age: 49
End: 2019-08-26

## 2019-08-26 VITALS
SYSTOLIC BLOOD PRESSURE: 136 MMHG | HEART RATE: 77 BPM | TEMPERATURE: 97.4 F | RESPIRATION RATE: 16 BRPM | DIASTOLIC BLOOD PRESSURE: 88 MMHG | OXYGEN SATURATION: 100 %

## 2019-08-26 DIAGNOSIS — H81.391 PERIPHERAL VERTIGO INVOLVING RIGHT EAR: ICD-10-CM

## 2019-08-26 PROCEDURE — 99283 EMERGENCY DEPT VISIT LOW MDM: CPT | Mod: Z6 | Performed by: EMERGENCY MEDICINE

## 2019-08-26 PROCEDURE — 25000132 ZZH RX MED GY IP 250 OP 250 PS 637: Performed by: EMERGENCY MEDICINE

## 2019-08-26 PROCEDURE — 99283 EMERGENCY DEPT VISIT LOW MDM: CPT | Performed by: EMERGENCY MEDICINE

## 2019-08-26 RX ORDER — DIAZEPAM 2 MG
2 TABLET ORAL ONCE
Status: COMPLETED | OUTPATIENT
Start: 2019-08-26 | End: 2019-08-26

## 2019-08-26 RX ADMIN — DIAZEPAM 2 MG: 2 TABLET ORAL at 20:06

## 2019-08-26 ASSESSMENT — ENCOUNTER SYMPTOMS
NAUSEA: 0
DIZZINESS: 1
VOMITING: 0
HEADACHES: 0

## 2019-08-26 NOTE — ED AVS SNAPSHOT
[facility] [place], Emergency Department  [address]  [facility] [address]  Phone:  [phone]  Fax:  [phone]                                    Jayce Myers   MRN: [id number]    Department:  [facility], Emergency Department   Date of Visit:  8/26/2019           After Visit Summary Signature Page    I have received my discharge instructions, and my questions have been answered. I have discussed any challenges I see with this plan with the nurse or doctor.    ..........................................................................................................................................  Patient/Patient Representative Signature      ..........................................................................................................................................  Patient Representative Print Name and Relationship to Patient    ..................................................               ................................................  Date                                   Time    ..........................................................................................................................................  Reviewed by Signature/Title    ...................................................              ..............................................  Date                                               Time          22EPIC Rev 08/18

## 2019-08-26 NOTE — TELEPHONE ENCOUNTER
Caller has severe vertigo at present; nystagmus, unable to walk unaided, nausea;   Triage protocl reviewed   advised to proceed to ED now   Caller understands and will comply   Lizzeth Hubbard RN  FNA      Reason for Disposition    SEVERE dizziness (vertigo) (e.g., unable to walk without assistance)    Additional Information    Negative: [1] Weakness (i.e., paralysis, loss of muscle strength) of the face, arm or leg on one side of the body AND [2] sudden onset AND [3] present now    Negative: [1] Numbness (i.e., loss of sensation) of the face, arm or leg on one side of the body AND [2] sudden onset AND [3] present now    Negative: [1] Loss of speech or garbled speech AND [2] sudden onset AND [3] present now    Negative: Difficult to awaken or acting confused (e.g., disoriented, slurred speech)    Negative: Sounds like a life-threatening emergency to the triager    Protocols used: DIZZINESS - VERTIGO-A-

## 2019-08-27 NOTE — ED PROVIDER NOTES
[facility] EMERGENCY DEPARTMENT ([facility])     August 26, 2019    History     Chief Complaint   Patient presents with     Dizziness     SEE TRIAGE NOTE     HPI  Jayce Myers is a 49 year old male who presents to the ED for 6 days of ongoing dizziness.  Patient states that for the past month ear fullness in his right ear.  He also reports in the past month he has had water sloshing around his right ear, some right ear pain, and some tinnitus.  However, patient states this has resolved.  Patient reports 6 days ago he felt off balance and dizzy.  He reports he was seen in clinic on 8/22/2019 and was prescribed meclizine.  Patient states he took the meclizine for 2 days but had difficulty sleeping and some tunnel vision, so he stopped taking it on 8/24/2019.  From 8/24-8/25 patient states that he felt normal.  However, today while taking a shower he started to feel dizzy again.  He states that closing his eyes and leaning over his bed  improves the pain.  He notes that he is leaning towards his right side.  He states that he  had a previous episode of vertigo that was improved after flushing out his ear a couple of years ago.  He states that he has been taking Benadryl and acetaminophen for congestion instead of meclizine.  He otherwise denies facial pain, headaches, nausea, vomiting, recent head injury or falls, or other medical problems.    PAST MEDICAL HISTORY  Past Medical History:   Diagnosis Date     Nevus     left leg      PAST SURGICAL HISTORY  No past surgical history on file.  FAMILY HISTORY  Family History   Problem Relation Age of Onset     Cancer Mother         salivary gland?     Thyroid Disease Mother      Diabetes Mother         DM2     Anemia Sister         Jayce ROLON does not known cause     Cancer - colorectal No family hx of      SOCIAL HISTORY  Social History     Tobacco Use     Smoking status: Former Smoker     Packs/day: 0.50     Years: 3.00     Pack years: 1.50     Types:  Cigarettes     Last attempt to quit: 2003     Years since quittin.5     Smokeless tobacco: Never Used     Tobacco comment: social smoking   Substance Use Topics     Alcohol use: Yes     Comment: social     MEDICATIONS  No current facility-administered medications for this encounter.      Current Outpatient Medications   Medication     cyclobenzaprine (FLEXERIL) 10 MG tablet     meclizine (ANTIVERT) 25 MG tablet     Multiple Vitamins-Minerals (CENTRUM ADULTS PO)     ALLERGIES  Allergies   Allergen Reactions     Pollen Extract Itching     Hayfever       I have reviewed the Medications, Allergies, Past Medical and Surgical History, and Social History in the Epic system.    Review of Systems   HENT: Positive for congestion.    Gastrointestinal: Negative for nausea and vomiting.   Neurological: Positive for dizziness. Negative for headaches.   All other systems reviewed and are negative.      Physical Exam   BP: 136/88  Pulse: 77  Temp: 97.4  F (36.3  C)  Resp: 16  SpO2: 100 %      Physical Exam   Gen:A&Ox3, no acute distress  HEENT:PERRL, no facial tenderness or wounds, head atraumatic, oropharynx clear, mucous membranes moist, TMs clear bilaterally  Neck:no bony tenderness or step offs, no JVD, trachea midline, no bruits, no cervical lymphadenopathy  Back: no CVA tenderness, no midline bony tenderness  CV:RRR without murmurs  PULM:Clear to auscultation bilaterally  Abd:soft, nontender, nondistended. Bowel sounds present and normal  UE:No traumatic injuries, skin normal  LE:no traumatic injuries, skin normal, no LE edema.   Neuro:CN II-XII intact, strength 5/5 of flexion and extension of the toes, ankles, knees, hips, hands, wrists, elbows and shoulders.  Sensation intact to touch throughout. Coordination normal on finger to nose testing. Reflexes 3/6 and symmetric throughout. No clonus. Gait stable. Normal romberg.    HINTS exam for vertigo:  Head impulse test: peripheral  Nystagmus: right horizontal  Test of  Skew: peripheral  Driss-Hallpike: mildly symptomatic on right  Skin: no rashes or ecchymoses      ED Course        Procedures   7:31 PM  The patient was seen and examined by Dr. Rodriguez in Room 6.                Critical Care time:  none             Labs Ordered and Resulted from Time of ED Arrival Up to the Time of Departure from the ED - No data to display         Assessments & Plan (with Medical Decision Making)   48 yo F presenting with intermittent vertigo in the setting of right ear discomfort.   Vitals stable.   Ear exam without visible abnormalities. Possible eustachian tube dysfunction vs. BPPV.   Symptoms are currently mild after treatment with benadryl at home. Not tolerating Meclizine well.   Exam with right horizontal nystagmus and peripheral findings on HINTS exam. Very low suspicion for acute intracranial abnormalities or posterior circulation stroke.   Started on flonase daily. Benadryl PRN.   Referred to follow up with ENT.          I have reviewed the nursing notes.    I have reviewed the findings, diagnosis, plan and need for follow up with the patient.    Discharge Medication List as of 8/26/2019  8:25 PM          Final diagnoses:   Peripheral vertigo involving right ear     IGerard, am serving as a trained medical scribe to document services personally performed by Liane Rodriguez MD, based on the provider's statements to me.      ILiane MD, was physically present and have reviewed and verified the accuracy of this note documented by Gerard Grimaldo.     8/26/2019   [facility], EMERGENCY DEPARTMENT  MD EARNESTINE Vann Katrina Anne, MD  08/27/19 1480

## 2019-08-27 NOTE — DISCHARGE INSTRUCTIONS
Thank you for coming to the [facility] Emergency Department.     Please follow up with ENT as soon as possible. [facility] ENT referral placed. You could also follow up at [facility] ENT [address] [place] [phone].     Start flonase nasal spray daily until all sinus or ear fullness/ fluid sloshing sensation is gone.   Use benadryl 25mg if vertigo returns.

## 2019-08-27 NOTE — ED TRIAGE NOTES
Pt states recent diag vertigo a few days ago and was better but then today had a sudden onset of dizziness after taking a shower an hour ago.  Pt states he has taken some benadryl and a advil PTA.  Pt has had no Meclozine today.

## 2019-08-27 NOTE — ED NOTES
Reviewed discharge instructions and ENT referral with patient and spouse. Denied further questions or concerns. Discharged to home.

## 2019-08-29 ENCOUNTER — TELEPHONE (OUTPATIENT)
Dept: OTOLARYNGOLOGY | Facility: CLINIC | Age: 49
End: 2019-08-29

## 2019-08-29 NOTE — TELEPHONE ENCOUNTER
1. Have you noticed any changes in hearing? Yes  2. Do you have ringing, buzzing, or other sounds in your ears or head, this is also referred to as Tinnitus? Yes  3. When and where was your last hearing test? no  4. Do you feel lightheaded or foggy? Yes  5. Do you have a spinning sensation? Yes  6. Is there any specific position that can bring on dizziness? Random. Sometimes laying on back   7. Does looking up cause dizziness? No  8. Does getting in and our of bed cause dizziness? Yes  9. Does turning over in bed increase or cause dizziness? Yes  10. Does bending over cause dizziness? No  11. Is there anything that you can do to prevent the dizziness? meds  12. Has the dizziness gotten better with time? No  13. Have you seen Physical Therapy for dizziness? (Please indicate clinic and as much of the location as possible): No  14. Are you being referred to a specific physician? No  15. Have you been evaluated/treated for your dizziness at any other location?  (If yes,obtian as much clinic/provider/locaiton as possible) Yes. (If yes answer the following questions:)   Have you seen any ENT, Neurology, or other providers for these symptoms?             Yes, If yes, where? [facility]. [facility]   if yes, who?    Have you had any balance or Audiology testing? No Have you had an MRI or CT scan of your head or neck? Yes, If yes, where? [facility]? if yes, who?     Would you like to receive your Release of Information by mail or e-mail?  e-mail

## 2019-08-30 ENCOUNTER — TRANSFERRED RECORDS (OUTPATIENT)
Dept: HEALTH INFORMATION MANAGEMENT | Facility: CLINIC | Age: 49
End: 2019-08-30

## 2019-09-10 NOTE — TELEPHONE ENCOUNTER
FUTURE VISIT INFORMATION      FUTURE VISIT INFORMATION:    Date: 10/22/19    Time: 9AM    Location: [facility]  REFERRAL INFORMATION:    Referring provider:  Liane Rodriguez MD    Referring providers clinic:  [facility] ED    Reason for visit/diagnosis : Peripheral vertigo involving right ear     RECORDS REQUESTED FROM:       Clinic name Comments Records Status Imaging Status   [facility] ED 8/26/19 ED notes [facility] urgent care 8/22/19 notes with Hunter TRAYLOR  7/20/16 notes with Yvette TRAYLOR [facility] 8/11/17 notes with Dr Chapo Maldonado [facility]   1/29/14 ED notes  1/29/14 MR Neck/Brain/head, CT Head  Care everywhere req 10/2   [facility] 9/11/19, 8/30/19  notes   8/30/19 audiogram  Scanned in EPIC            9/9/19 7:33PM sent an email to patient re: VIKTORIA for [facility] and [facility] recs - Amay   10/2/19 9:47AM sent a fax to [facility] for images and called patient re: VIKTORIA that was emailed out for [facility] records - Amay   10/7/19 4:58PM still waiting on [facility] images, records are scanned in Epic sending a message to audiology to review - Amay

## 2019-09-11 ENCOUNTER — TRANSFERRED RECORDS (OUTPATIENT)
Dept: HEALTH INFORMATION MANAGEMENT | Facility: CLINIC | Age: 49
End: 2019-09-11

## 2019-09-24 ENCOUNTER — DOCUMENTATION ONLY (OUTPATIENT)
Dept: CARE COORDINATION | Facility: CLINIC | Age: 49
End: 2019-09-24

## 2019-09-26 ENCOUNTER — ALLIED HEALTH/NURSE VISIT (OUTPATIENT)
Dept: NURSING | Facility: CLINIC | Age: 49
End: 2019-09-26
Payer: COMMERCIAL

## 2019-09-26 DIAGNOSIS — Z23 NEED FOR PROPHYLACTIC VACCINATION AND INOCULATION AGAINST INFLUENZA: Primary | ICD-10-CM

## 2019-09-26 PROCEDURE — 99207 ZZC NO CHARGE NURSE ONLY: CPT

## 2019-09-26 PROCEDURE — 90682 RIV4 VACC RECOMBINANT DNA IM: CPT

## 2019-09-26 PROCEDURE — 90471 IMMUNIZATION ADMIN: CPT

## 2019-10-08 DIAGNOSIS — R42 DIZZINESS: Primary | ICD-10-CM

## 2019-10-08 NOTE — TELEPHONE ENCOUNTER
Per patient record review: Patient presents with episodic vertigo, right ear aural fullness and pain, changes to his hearing sensitivity, and tinnitus lasting up to 6 days at a time. Patient reports symptoms are provoked by rolling over in bed and getting in and out of bed. Patient reports most recent vertigo episode occurred upon looking up in the shower. Patient reported hanging his head down and to the right over his bed helped alleviate symptoms. Patient has been evaluated by ED, primary care physician and Dr. Meyers on 8/30/2019. Patient has thus far been diagnosed with right neuritis at the ED and right meniere's disease by Dr. Meyers. Patient has been  prescribed Meclizine and a diuretic to help control symptoms. Patient has a history of Meniere's disease (father). Patient's most recent hearing test was performed on 8/30/19 at Dr. Meyers's office. Results reportedly revealed essentially normal hearing bilaterally; no audiogram is available for review.    I will request order for updated hearing test, Ecog, VNG and rotary chair testing for this patient prior to his appt with Dr. Nissen on 10/22/19.    Radu Veliz. CCC-A  Clinical Vestibular Audiologist   [place] License #[id number]

## 2019-10-22 ENCOUNTER — PRE VISIT (OUTPATIENT)
Dept: OTOLARYNGOLOGY | Facility: CLINIC | Age: 49
End: 2019-10-22

## 2019-10-25 ENCOUNTER — OFFICE VISIT (OUTPATIENT)
Dept: AUDIOLOGY | Facility: CLINIC | Age: 49
End: 2019-10-25
Payer: COMMERCIAL

## 2019-10-25 DIAGNOSIS — R42 DIZZINESS AND GIDDINESS: Primary | ICD-10-CM

## 2019-10-25 DIAGNOSIS — R42 DIZZINESS: ICD-10-CM

## 2019-10-25 NOTE — PROGRESS NOTES
AUDIOLOGY REPORT    SUMMARY: Audiology visit completed. See audiogram for results.      RECOMMENDATIONS: Follow-up with ENT.    Radu Mccurdy, [facility]  Licensed Audiologist  [place] License #[id number]

## 2019-10-25 NOTE — PROGRESS NOTES
"AUDIOLOGY REPORT    BACKGROUND INFORMATION: Jayce Myers was seen in Audiology at the [facility] [place] on 10/25/2019 for an electrocochleography (ECochG) evaluation, referred by Dr Rick Nissen. The patient reports 2-3 instances of dizziness over the past decade or so with the last episode occurring in August and September of 2019. The patient reports that the first instance occurred during antibiotic treatment for a sinus infection and the next during right-ear cerumen impaction that ceased once removed. The most recent occurrence of dizziness began after the patient started to experience right aural fullness, right otalgia, right episodic high-frequency ringing tinnitus, episodic \"distortion\" of sound from the right ear and sinus pressure. The patient reports that although symptoms started in the right ear he did develop ear pain and tinnitus in the left ear as well. The patient reports that his dizziness was severe for approximately 10 days at the end of August and that rolling over in bed, getting out of bed, laying on his back or looking up in the shower could trigger his dizziness but that if he held the position the dizziness did eventually cease. The patient reports that hanging his head down and to the right or laying down (on either side) can help minimize the dizziness. The patient reports that his dizziness was less severe for a couple of weeks after the initial 10-day severe period before dissipating in September of 2019. The patient reports that right aural fullness remains but that the other ear symptoms have also ceased within the past week or so. The patient notes that use of Sudafed helps his ear and sinus pressure issues and that use of Benadryl seems to help minimize the dizziness. The patient also reports that he can \"pull down on his right ear\" and feel fluid clearing, but he does not have drainage come out of the ear.     The patient reports that he " "did visit the emergency room for symptoms and chart notes indicate he was diagnosed with right neuritis. 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     Previous chart notes have indicated that the patient's father was diagnosed with Meneire's disease but the patient confirms today that his father suffered from migraines for many years before developing vertigo and physicians believed that he may have developed vestibular migraines. The patient reports that his sister also suffered from migraines and that she had \"blocking\" in her ear tubes that prevented fluid from draining and that this led to issues with dizziness. The patient's brother also suffers from sinus issues.     The patient denies history of chronic ear issues or ear surgeries as a child or in adulthood. He denies history of head trauma, jaw issues/TMJ, history of migraines, history of heart or blood pressure issues, diagnosis of cancer or chemotherapy treatment. The patient denies autophonia, dizziness with exertion or dizziness with loud sounds.     A hearing evaluation completed earlier today at this facility indicated symmetric normal hearing bilaterally with normal middle ear status bilaterally.       TEST RESULTS AND PROCEDURES:   Abuse Screening:  Do you feel unsafe at home or work/school? No  Do you feel threatened by someone? No  Does anyone try to keep you from having contact with others, or doing things outside of your home? No  Physical signs of abuse present? No    Tympanograms were assessed/billed during an " audiologic assessment at this facility earlier today and showed normal eardrum mobility bilaterally. Using a microscope tympanic membranes were visualized.       A two-channel ECochG recording was performed for clicks bilaterally.  Clicks for the right ear showed normal SP/AP ratios.    Clicks for the left ear showed normal SP/AP ratios.         Click SP/AP ratio   Right ear  .293   Left ear  .245     Abrnormal SP/AP ratios must be greater than .43 for clicks.     SUMMARY AND RECOMMENDATIONS: Today s ECochG revealed normal SP/AP ratios.  Please call this clinic with questions regarding today s results.  Follow-up with Dr Rick Nissen for medical management.    Radu Briones.  Licensed Audiologist  [place] #[id number]

## 2019-11-05 ENCOUNTER — OFFICE VISIT (OUTPATIENT)
Dept: AUDIOLOGY | Facility: CLINIC | Age: 49
End: 2019-11-05
Payer: COMMERCIAL

## 2019-11-05 DIAGNOSIS — R42 DIZZINESS AND GIDDINESS: Primary | ICD-10-CM

## 2019-11-05 NOTE — PROGRESS NOTES
"AUDIOLOGY REPORT-BALANCE ASSESSMENT    SUBJECTIVE: Jayce Myers, 49 year old, was seen in Audiology at the [facility] [place] on 11/5/2019, for videonystagmography (VNG), referred by Rick Nissen, M.D. The patient reports 2-3 instances of dizziness over the past decade or so with the last episode occurring in August and September of 2019. The patient reports that the first instance occurred during antibiotic treatment for a sinus infection and the next during right-ear cerumen impaction that ceased once removed. The most recent occurrence of dizziness began after the patient started to experience right aural fullness, right otalgia, right episodic high-frequency ringing tinnitus, episodic \"distortion\" of sound from the right ear and sinus pressure. The patient reports that although symptoms started in the right ear he did develop ear pain and tinnitus in the left ear as well. The patient reports that his dizziness was severe for approximately 10 days at the end of August and that rolling over in bed, getting out of bed, laying on his back or looking up in the shower could trigger his dizziness but that if he held the position the dizziness did eventually cease. The patient reports that hanging his head down and to the right or laying down (on either side) can help minimize the dizziness. The patient reports that his dizziness was less severe for a couple of weeks after the initial 10-day severe period before dissipating in September of 2019. He now just feels a residual lightheadedness, and headache. He feels he has to be more cautious when moving, especially in the morning when he first gets out of bed. He feels that he has the tendency to veer to the right. The patient reports that right aural fullness remains but that the other ear symptoms have also ceased within the past week or so. The patient notes that use of Sudafed helps his ear and sinus pressure issues and that " "use of Benadryl seems to help minimize the dizziness. The patient also reports that he can \"pull down on his right ear\" and feel fluid clearing, but he does not have drainage come out of the ear.     The patient reports that he did visit the emergency room for symptoms and chart notes indicate he was diagnosed with right neuritis. 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    Previous chart notes have indicated that the patient's father was diagnosed with Menière's disease but the patient confirms today that his father suffered from migraines for many years before developing vertigo and physicians believed that he may have developed vestibular migraines. The patient reports that his sister also suffered from migraines and that she had \"blocking\" in her ear tubes that prevented fluid from draining and that this led to issues with dizziness. The patient's brother also suffers from sinus issues.     The patient denies history of chronic ear issues or ear surgeries as a child or in adulthood. He denies history of head trauma, jaw issues/TMJ, history of migraines, history of heart or blood pressure issues, diagnosis of cancer or chemotherapy treatment. The patient denies autophonia, dizziness with exertion or dizziness with loud sounds.     A hearing evaluation 10/25/19 indicated symmetric normal hearing bilaterally with normal middle ear status bilaterally. Ecog testing on 10/25/19 showed normal SP/AP ratios bilaterally. " Jayce has not taken any antivestibular medications in the past 48 hours. Additional information that may be pertinent: patient reports he did extensive training when he was younger to be able to keep his eyes fixated in one position with them closed.     OBJECTIVE:    Dizziness Handicap Inventory (DHI): 40/100: Moderate perceived impairment    Rotational chair testing:   Sinusoidal harmonic acceleration test:  Spontaneous nystagmus: Absent  Phase: Abnormal phase lead at 0.04, 0.08, borderline at 0.16, and normal at 0.32 Hz.   Gain: Borderline low to Abnormal at 0.01, 0.02, 0.04, 0.08, 0.16, and 0.32 Hz (gain at 0.01 & 0.02 Hz is too low to interpret other parameters)  Symmetry: Abnormal symmetry to the left 0.08, and borderline at 0.32 Hz, with normal at 0.04 & 0.16 Hz  Spectral Purity: Normal at 0.01, 0.02, 0.04, 0.08, 0.16 and 0.32 Hz  Overall rotational chair test: Abnormal at 0.01, 0.02, 0.04, 0.08, 0.16 and 0.32 Hz    Videonystagmography (VNG) testing:  Prescreening:  Tympanograms: Normal eardrum mobility bilaterally. Note: this test is completed to determine the status of the middle ear before irrigations are completed.  Ocular range of motion and ocular counter roll: Normal  Cross/cover: Normal  Head Thrust: Negative     Nystagmus Tests:  Gaze-Horizontal with fixation:   Center: Normal   Right: Normal   Left: Normal  Gaze-Vertical with fixation:   Up: Normal   Down: Normal  Gaze with fixation denied   Center: Normal   Right: 1 deg/sec right beating (likely endpoint/non-significant)   Left: Normal   Up: Normal  High Frequency Headshake:   Horizontal: Negative. A few beats of 1 deg/sec right beating with no symptoms present.   Vertical: Negative. A few beats of 1 deg/sec left beating with no symptoms present.    Driss-Hallpike Head Right: Negative for nystagmus & symptoms   Menan-Hallpike Head Left: Negative for nystagmus & symptoms   Roll Test Head Right: Negative for nystagmus & symptoms   Roll Test Head Left:  Negative for nystagmus & symptoms     Positional Testing:  Positionals: Supine: Normal  Positionals: Body Right: Normal  Positionals: Body Left: Normal  Positionals: Pre-Caloric: Normal    Oculomotor Tests:  Saccades: Normal  Anti-saccades: Normal; Patient able to perform task  Pursuit: Normal    Calorics :  (Tested at 44 degrees and 30 degrees Celsius for 30 seconds for warm and cool water, respectively):  Right Warm Eye Speed: 5 degrees per second right beating  Left Warm Eye Speed: 13 degrees per second left beating  Right Cool Eye Speed: 3 degrees per second left beating  Left Cool Eye Speed: 7 degrees per second right beating  Difference between ear: 43% right hypofunction. (Greater than 25% considered clinically significant.)  Fixation Index: 0.08; Normal  Overall caloric test: Abnormal: Right hypofunction found    ASSESSMENT:  1. There were no indications of central vestibular system involvement noted on today's exam.     2. Indications of peripheral vestibular system involvement noted on today's exam were as follows:   - 43% right hypofunction via Calorics  - Low frequency phase lead on rotary chair    PLAN:  Patient may benefit from vestibular physical therapy for symptom reduction/continued compensation. Follow-up with Dr. Nissen for medical management.  Please call this clinic at [phone] with questions regarding these results or recommendations.       Radu Hurd.  Licensed Audiologist  [place] # [id number]

## 2019-11-12 ENCOUNTER — OFFICE VISIT (OUTPATIENT)
Dept: OTOLARYNGOLOGY | Facility: CLINIC | Age: 49
End: 2019-11-12
Payer: COMMERCIAL

## 2019-11-12 VITALS
BODY MASS INDEX: 25.61 KG/M2 | RESPIRATION RATE: 15 BRPM | HEIGHT: 68 IN | HEART RATE: 90 BPM | WEIGHT: 169 LBS | DIASTOLIC BLOOD PRESSURE: 84 MMHG | OXYGEN SATURATION: 96 % | SYSTOLIC BLOOD PRESSURE: 145 MMHG

## 2019-11-12 DIAGNOSIS — H61.23 EXCESSIVE CERUMEN IN BOTH EAR CANALS: ICD-10-CM

## 2019-11-12 DIAGNOSIS — H93.11 TINNITUS, RIGHT: ICD-10-CM

## 2019-11-12 DIAGNOSIS — R42 DIZZINESS: Primary | ICD-10-CM

## 2019-11-12 DIAGNOSIS — H93.8X1 EAR PRESSURE, RIGHT: ICD-10-CM

## 2019-11-12 ASSESSMENT — PAIN SCALES - GENERAL: PAINLEVEL: NO PAIN (0)

## 2019-11-12 ASSESSMENT — MIFFLIN-ST. JEOR: SCORE: 1602.83

## 2019-11-12 NOTE — PATIENT INSTRUCTIONS
1.  You were seen in the ENT Clinic today by Dr. Nissen.  If you have any questions or concerns after your appointment, please call [phone]. Press option #1 for scheduling related needs. Press option #3 for Nurse advice.    2.  Please schedule an appointment for the following:   - MRI Scan - Temporal Bone/IAC   - Physical Therapy - Vestibular Therapy    3.  Plan is to return to clinic to see Dr. Nissen in 2 months with an Audiogram and Tympanogram (hearing test) prior to appointment IF symptoms continue.      Dorota Vera LPN  [facility]  [phone]

## 2019-11-12 NOTE — PROGRESS NOTES
Dear Chapo Lewis:    I had the pleasure of meeting Jayce Myers in consultation today at the [facility] at your request.    CHIEF COMPLAINT: Dizziness    HISTORY OF PRESENT ILLNESS: Patient is a 49-year-old in today for assessment of dizziness.  He had onset of dizziness this time in August.  Toward the end of the day in August, spinning vertigo developed.  He had intermittent episodes of spinning for the next 10 days.  He would have a day of spinning that would be bad enough he did miss work, then he might be okay the next day.  Again it was very intermittent off and on for about 10 days.  Since then the dizziness has been gone and he is doing fine.  He had 2 previous episodes of similar dizziness, first 1 6 years ago.  Again he developed spinning vertigo associated with nausea and vomiting at that time.  He had about a week of intermittent dizziness and then he was fine until 3 years ago he had a similar episode for about a week of intermittent spinning.  Other than that though his balance has been fine.  Since August he is also noticed increased right fullness and increased right tinnitus.  He feels his hearing has been fine and is symmetrical.  He did see Dr. Meyers about a month ago who felt he may have Ménière's, he was given meclizine.  He has a follow-up appointment in December with some type of x-ray and reassessment.  He again has had right constant tinnitus with occasionally mild left tinnitus.  When he gets up in the morning he may notice just a little bit of off-balance mainly to the right for short time and then is gone.  He has had occasional headaches, there is history of migraines in the family but he does not feel he has migraines.  He has a sister and a dad with migraine.  He denies any dysphasia, hoarseness, facial paresthesias.  He did have a recent VNG completed and he comes in today for assessment.    ALLERGIES:    Allergies   Allergen  Reactions     Pollen Extract Itching     Hayfever       HABITS: Social History    Substance and Sexual Activity      Alcohol use: Yes        Comment: social     History   Smoking Status     Former Smoker     Packs/day: 0.50     Years: 3.00     Types: Cigarettes     Quit date: 2003   Smokeless Tobacco     Never Used     Comment: social smoking         PAST MEDICAL HISTORY: Please see today's intake form (for the remainder of the PMH) which I reviewed and signed.  Past Medical History:   Diagnosis Date     Nevus     left leg        FAMILY HISTORY/SOCIAL HISTORY:   Family History   Problem Relation Age of Onset     Cancer Mother         salivary gland?     Thyroid Disease Mother      Diabetes Mother         DM2     Anemia Sister         Jayce ROLON does not known cause     Cancer - colorectal No family hx of       Social History     Socioeconomic History     Marital status:      Spouse name: Not on file     Number of children: Not on file     Years of education: Not on file     Highest education level: Not on file   Occupational History     Not on file   Social Needs     Financial resource strain: Not on file     Food insecurity:     Worry: Not on file     Inability: Not on file     Transportation needs:     Medical: Not on file     Non-medical: Not on file   Tobacco Use     Smoking status: Former Smoker     Packs/day: 0.50     Years: 3.00     Pack years: 1.50     Types: Cigarettes     Last attempt to quit: 2003     Years since quittin.8     Smokeless tobacco: Never Used     Tobacco comment: social smoking   Substance and Sexual Activity     Alcohol use: Yes     Comment: social     Drug use: No     Sexual activity: Yes     Partners: Female   Lifestyle     Physical activity:     Days per week: Not on file     Minutes per session: Not on file     Stress: Not on file   Relationships     Social connections:     Talks on phone: Not on file     Gets together: Not on file     Attends Voodoo service:  Not on file     Active member of club or organization: Not on file     Attends meetings of clubs or organizations: Not on file     Relationship status: Not on file     Intimate partner violence:     Fear of current or ex partner: Not on file     Emotionally abused: Not on file     Physically abused: Not on file     Forced sexual activity: Not on file   Other Topics Concern     Parent/sibling w/ CABG, MI or angioplasty before 65F 55M? No   Social History Narrative     Not on file       REVIEW OF SYSTEMS: Patient Supplied Answers to Review of Systems   ENT ROS 11/12/2019   Neurology Dizzy spells, Headache   Ears, Nose, Throat Ringing/noise in ears, Nasal congestion or drainage   Musculoskeletal Back pain, Neck pain       The remainder of the 10 point ROS is negative    PHYSICIAL EXAMINATION:  Constitutional: The patient was well-groomed and in no acute distress.   Skin: Warm and pink.  Psychiatric: The patient's affect was calm, cooperative, and appropriate.   Respiratory: Breathing comfortably without stridor or exertion of accessory muscles.  Eyes: Pupils were equal and reactive. Extraocular movement intact.   Head: Normocephalic and atraumatic. No lesions or scars.  Ears: Patient placed under the microscope for microscopic evaluation and cleaning of cerumen which was obscuring full visualization of both TMs. Under high power magnification, the right ear was examined and cleaned of cerumen using curet, alligator forceps, and suction.  After cleaning, TM is fully visualized and has normal position with normal middle ear aeration. The left ear was then cleaned and inspected using microscope, instruments and similar techniques. After cleaning of cerumen, the TM has normal position with normal aeration to middle ear.  Nose: Sinuses were nontender. Anterior rhinoscopy revealed midline septum and absence of purulence or polyps.  Oral Cavity: Normal tongue, floor of mouth, buccal mucosa, and palate. No lesions or masses  on inspection or palpation. No abnormal lymph tissue in the oropharynx.   Neck: The parotid is soft without masses. Supple with normal laryngeal and tracheal landmarks.   Lymphatic: There is no palpable lymphadenopathy or other masses in the neck.   Neurologic: Alert and oriented x 3. Cranial nerves III-XI within normal limits. Voice quality normal.  Cerebellar Function Tests:  Grossly normal    Audiogram: Audiogram performed shows normal hearing through all frequencies in both ears.  100% discrimination bilaterally.  Normal type A tympanograms bilaterally.    VNG: He shows a 43% right vestibular hypofunction via calorics.  No central findings.      IMPRESSION AND PLAN:   1. Dizziness: With his vestibular asymmetry, I am going to proceed with an MRI scan to make sure we see nothing retrocochlear.  We will call him the results of that.  His dizziness has basically cleared just minimal unsteadiness for short time in the morning.  I am going to have him go through physical therapy for vestibular rehabilitation to see if we get the dizziness to clear, if the MRI scan is normal.  I will see him back in 2 months with continued problems.  2. Right tinnitus: MRI scan to check for any retrocochlear pathology.  No other treatment needed at this time, monitor.  3. Right ear fullness: Recommend monitor.  4. Excessive cerumen: Cleaned today, no further treatment needed, monitor.    Thank you very much for the opportunity to participate in the care of your patient.    Rick L Nissen MD

## 2019-11-12 NOTE — LETTER
11/12/2019       RE: Jayce Myers  [address] [facility] [address]     Dear Colleague,    Thank you for referring your patient, Jayce Myers, to the [facility] EAR NOSE AND THROAT at [facility]. Please see a copy of my visit note below.    Dear Chapo Lewis:    I had the pleasure of meeting Jayce Myers in consultation today at the [facility] at your request.    CHIEF COMPLAINT: Dizziness    HISTORY OF PRESENT ILLNESS: Patient is a 49-year-old in today for assessment of dizziness.  He had onset of dizziness this time in August.  Toward the end of the day in August, spinning vertigo developed.  He had intermittent episodes of spinning for the next 10 days.  He would have a day of spinning that would be bad enough he did miss work, then he might be okay the next day.  Again it was very intermittent off and on for about 10 days.  Since then the dizziness has been gone and he is doing fine.  He had 2 previous episodes of similar dizziness, first 1 6 years ago.  Again he developed spinning vertigo associated with nausea and vomiting at that time.  He had about a week of intermittent dizziness and then he was fine until 3 years ago he had a similar episode for about a week of intermittent spinning.  Other than that though his balance has been fine.  Since August he is also noticed increased right fullness and increased right tinnitus.  He feels his hearing has been fine and is symmetrical.  He did see Dr. Meyers about a month ago who felt he may have Ménière's, he was given meclizine.  He has a follow-up appointment in December with some type of x-ray and reassessment.  He again has had right constant tinnitus with occasionally mild left tinnitus.  When he gets up in the morning he may notice just a little bit of off-balance mainly to the right for short time and then is gone.  He has had occasional headaches, there is  history of migraines in the family but he does not feel he has migraines.  He has a sister and a dad with migraine.  He denies any dysphasia, hoarseness, facial paresthesias.  He did have a recent VNG completed and he comes in today for assessment.    ALLERGIES:    Allergies   Allergen Reactions     Pollen Extract Itching     Hayfever       HABITS: Social History    Substance and Sexual Activity      Alcohol use: Yes        Comment: social     History   Smoking Status     Former Smoker     Packs/day: 0.50     Years: 3.00     Types: Cigarettes     Quit date: 2003   Smokeless Tobacco     Never Used     Comment: social smoking         PAST MEDICAL HISTORY: Please see today's intake form (for the remainder of the PMH) which I reviewed and signed.  Past Medical History:   Diagnosis Date     Nevus     left leg        FAMILY HISTORY/SOCIAL HISTORY:   Family History   Problem Relation Age of Onset     Cancer Mother         salivary gland?     Thyroid Disease Mother      Diabetes Mother         DM2     Anemia Sister         Jayce ROLON does not known cause     Cancer - colorectal No family hx of       Social History     Socioeconomic History     Marital status:      Spouse name: Not on file     Number of children: Not on file     Years of education: Not on file     Highest education level: Not on file   Occupational History     Not on file   Social Needs     Financial resource strain: Not on file     Food insecurity:     Worry: Not on file     Inability: Not on file     Transportation needs:     Medical: Not on file     Non-medical: Not on file   Tobacco Use     Smoking status: Former Smoker     Packs/day: 0.50     Years: 3.00     Pack years: 1.50     Types: Cigarettes     Last attempt to quit: 2003     Years since quittin.8     Smokeless tobacco: Never Used     Tobacco comment: social smoking   Substance and Sexual Activity     Alcohol use: Yes     Comment: social     Drug use: No     Sexual activity:  Yes     Partners: Female   Lifestyle     Physical activity:     Days per week: Not on file     Minutes per session: Not on file     Stress: Not on file   Relationships     Social connections:     Talks on phone: Not on file     Gets together: Not on file     Attends Oriental orthodox service: Not on file     Active member of club or organization: Not on file     Attends meetings of clubs or organizations: Not on file     Relationship status: Not on file     Intimate partner violence:     Fear of current or ex partner: Not on file     Emotionally abused: Not on file     Physically abused: Not on file     Forced sexual activity: Not on file   Other Topics Concern     Parent/sibling w/ CABG, MI or angioplasty before 65F 55M? No   Social History Narrative     Not on file       REVIEW OF SYSTEMS: Patient Supplied Answers to Review of Systems  [facility] ROS 11/12/2019   Neurology Dizzy spells, Headache   Ears, Nose, Throat Ringing/noise in ears, Nasal congestion or drainage   Musculoskeletal Back pain, Neck pain       The remainder of the 10 point ROS is negative    PHYSICIAL EXAMINATION:  Constitutional: The patient was well-groomed and in no acute distress.   Skin: Warm and pink.  Psychiatric: The patient's affect was calm, cooperative, and appropriate.   Respiratory: Breathing comfortably without stridor or exertion of accessory muscles.  Eyes: Pupils were equal and reactive. Extraocular movement intact.   Head: Normocephalic and atraumatic. No lesions or scars.  Ears: Patient placed under the microscope for microscopic evaluation and cleaning of cerumen which was obscuring full visualization of both TMs. Under high power magnification, the right ear was examined and cleaned of cerumen using curet, alligator forceps, and suction.  After cleaning, TM is fully visualized and has normal position with normal middle ear aeration. The left ear was then cleaned and inspected using microscope, instruments and similar techniques. After  cleaning of cerumen, the TM has normal position with normal aeration to middle ear.  Nose: Sinuses were nontender. Anterior rhinoscopy revealed midline septum and absence of purulence or polyps.  Oral Cavity: Normal tongue, floor of mouth, buccal mucosa, and palate. No lesions or masses on inspection or palpation. No abnormal lymph tissue in the oropharynx.   Neck: The parotid is soft without masses. Supple with normal laryngeal and tracheal landmarks.   Lymphatic: There is no palpable lymphadenopathy or other masses in the neck.   Neurologic: Alert and oriented x 3. Cranial nerves III-XI within normal limits. Voice quality normal.  Cerebellar Function Tests:  Grossly normal    Audiogram: Audiogram performed shows normal hearing through all frequencies in both ears.  100% discrimination bilaterally.  Normal type A tympanograms bilaterally.    VNG: He shows a 43% right vestibular hypofunction via calorics.  No central findings.      IMPRESSION AND PLAN:   1. Dizziness: With his vestibular asymmetry, I am going to proceed with an MRI scan to make sure we see nothing retrocochlear.  We will call him the results of that.  His dizziness has basically cleared just minimal unsteadiness for short time in the morning.  I am going to have him go through physical therapy for vestibular rehabilitation to see if we get the dizziness to clear, if the MRI scan is normal.  I will see him back in 2 months with continued problems.  2. Right tinnitus: MRI scan to check for any retrocochlear pathology.  No other treatment needed at this time, monitor.  3. Right ear fullness: Recommend monitor.  4. Excessive cerumen: Cleaned today, no further treatment needed, monitor.    Thank you very much for the opportunity to participate in the care of your patient.    Rick L Nissen MD

## 2019-11-12 NOTE — NURSING NOTE
"Chief Complaint   Patient presents with     Consult     peripheral vertigo right ear      Blood pressure (!) 145/84, pulse 90, resp. rate 15, height 1.73 m (5' 8.11\"), weight 76.7 kg (169 lb), SpO2 96 %.    Woody Frank LPN    "

## 2019-11-20 ENCOUNTER — ANCILLARY PROCEDURE (OUTPATIENT)
Dept: MRI IMAGING | Facility: CLINIC | Age: 49
End: 2019-11-20
Attending: OTOLARYNGOLOGY
Payer: COMMERCIAL

## 2019-11-20 DIAGNOSIS — R42 DIZZINESS: ICD-10-CM

## 2019-11-20 RX ORDER — GADOBUTROL 604.72 MG/ML
7.5 INJECTION INTRAVENOUS ONCE
Status: COMPLETED | OUTPATIENT
Start: 2019-11-20 | End: 2019-11-20

## 2019-11-20 RX ADMIN — GADOBUTROL 7.5 ML: 604.72 INJECTION INTRAVENOUS at 18:03

## 2019-11-21 NOTE — DISCHARGE INSTRUCTIONS
MRI Contrast Discharge Instructions    The IV contrast you received today will pass out of your body in your  urine. This will happen in the next 24 hours. You will not feel this process.  Your urine will not change color.    Drink at least 4 extra glasses of water or juice today (unless your doctor  has restricted your fluids). This reduces the stress on your kidneys.  You may take your regular medicines.    If you are on dialysis: It is best to have dialysis today.    If you have a reaction: Most reactions happen right away. If you have  any new symptoms after leaving the hospital (such as hives or swelling),  call your hospital at the correct number below. Or call your family doctor.  If you have breathing distress or wheezing, call 911.    Special instructions: ***    I have read and understand the above information.    Signature:______________________________________ Date:___________    Staff:__________________________________________ Date:___________     Time:__________    [place] [facility] Departments:    ___[place]: [phone]  ___[facility]: [phone]  ___[place]: [phone] ___[facility]: [phone]  ___[facility]: [phone]  ___[facility]: [phone]  ___[phone]  ___[facility]: [phone]  ___[phone]

## 2019-12-03 ENCOUNTER — HOSPITAL ENCOUNTER (OUTPATIENT)
Dept: GENERAL RADIOLOGY | Facility: CLINIC | Age: 49
Discharge: HOME | End: 2019-12-03
Attending: OTOLARYNGOLOGY | Admitting: OTOLARYNGOLOGY
Payer: COMMERCIAL

## 2019-12-03 DIAGNOSIS — R42 DIZZINESS: ICD-10-CM

## 2019-12-03 PROCEDURE — 70120 X-RAY EXAM OF MASTOIDS: CPT

## 2019-12-04 DIAGNOSIS — Z20.7 EXPOSURE TO SCABIES: Primary | ICD-10-CM

## 2019-12-11 ENCOUNTER — TRANSFERRED RECORDS (OUTPATIENT)
Dept: HEALTH INFORMATION MANAGEMENT | Facility: CLINIC | Age: 49
End: 2019-12-11

## 2019-12-17 ENCOUNTER — MYC MEDICAL ADVICE (OUTPATIENT)
Dept: OTOLARYNGOLOGY | Facility: CLINIC | Age: 49
End: 2019-12-17

## 2020-01-13 DIAGNOSIS — H91.90 HEARING LOSS, UNSPECIFIED HEARING LOSS TYPE, UNSPECIFIED LATERALITY: Primary | ICD-10-CM

## 2020-01-14 ENCOUNTER — OFFICE VISIT (OUTPATIENT)
Dept: OTOLARYNGOLOGY | Facility: CLINIC | Age: 50
End: 2020-01-14
Payer: COMMERCIAL

## 2020-01-14 ENCOUNTER — OFFICE VISIT (OUTPATIENT)
Dept: AUDIOLOGY | Facility: CLINIC | Age: 50
End: 2020-01-14
Payer: COMMERCIAL

## 2020-01-14 VITALS
TEMPERATURE: 97.8 F | HEIGHT: 68 IN | HEART RATE: 64 BPM | SYSTOLIC BLOOD PRESSURE: 122 MMHG | RESPIRATION RATE: 18 BRPM | DIASTOLIC BLOOD PRESSURE: 83 MMHG | BODY MASS INDEX: 26.22 KG/M2 | WEIGHT: 173 LBS

## 2020-01-14 DIAGNOSIS — R42 DIZZINESS: ICD-10-CM

## 2020-01-14 DIAGNOSIS — H93.11 TINNITUS, RIGHT: Primary | ICD-10-CM

## 2020-01-14 DIAGNOSIS — H91.90 HEARING LOSS, UNSPECIFIED HEARING LOSS TYPE, UNSPECIFIED LATERALITY: ICD-10-CM

## 2020-01-14 DIAGNOSIS — H93.11 TINNITUS OF RIGHT EAR: Primary | ICD-10-CM

## 2020-01-14 ASSESSMENT — PAIN SCALES - GENERAL: PAINLEVEL: NO PAIN (0)

## 2020-01-14 ASSESSMENT — MIFFLIN-ST. JEOR: SCORE: 1619.22

## 2020-01-14 NOTE — PATIENT INSTRUCTIONS
1.  You were seen in the ENT Clinic today by Dr. Nissen.  If you have any questions or concerns after your appointment, please call [phone]. Press option #1 for scheduling related needs. Press option #3 for Nurse advice.    2.  Plan is to return to clinic in 1 year with an Audiogram and Tympanogram (hearing test) prior to appointment, or sooner with any concerns.      Dorota Vera LPN  [facility]  [phone]

## 2020-01-14 NOTE — PROGRESS NOTES
Dear Chapo Lewis:    I had the pleasure of seeing Jayce Myers in followup today at the [facility].    CHIEF COMPLAINT: Dizziness    HISTORY OF PRESENT ILLNESS: Patient is a 49-year-old in today for follow-up from his last visit 11/12/2019.  He has had 3 episodes of spinning dizziness that lasted for about a week, 1 6 years ago, the second 1 3 years ago, and last November.  He has had some right tinnitus since the last episode and the right fullness occasionally.  We did do an MRI scan after his last visit and that has been negative.  He did see Dr. Meyers and was put on a diuretic, he was on that for short time and has stopped that.  On his follow-up today, he feels the dizziness is doing well.  He has not had any more episodes.  We did discuss that his MRI is negative.  She still has the right tinnitus.  He is happy with the dizziness being absent.    MEDICATIONS: Please refer to the detailed medication reconciliation performed by my nurse today, which I have reviewed and signed.     ALLERGIES:    Allergies   Allergen Reactions     Pollen Extract Itching     Hayfever       HABITS: Social History    Substance and Sexual Activity      Alcohol use: Yes        Comment: social     History   Smoking Status     Former Smoker     Packs/day: 0.50     Years: 3.00     Types: Cigarettes     Quit date: 1/26/2003   Smokeless Tobacco     Never Used     Comment: social smoking         PAST MEDICAL HISTORY:  Please see today's intake form (for the remainder of the PMH) which I reviewed and signed.  Past Medical History:   Diagnosis Date     Nevus     left leg        FAMILY HISTORY/SOCIAL HISTORY:    Family History   Problem Relation Age of Onset     Cancer Mother         salivary gland?     Thyroid Disease Mother      Diabetes Mother         DM2     Anemia Sister         Jayce ROLON does not known cause     Cancer - colorectal No family hx of       Social History      Socioeconomic History     Marital status:      Spouse name: Not on file     Number of children: Not on file     Years of education: Not on file     Highest education level: Not on file   Occupational History     Not on file   Social Needs     Financial resource strain: Not on file     Food insecurity:     Worry: Not on file     Inability: Not on file     Transportation needs:     Medical: Not on file     Non-medical: Not on file   Tobacco Use     Smoking status: Former Smoker     Packs/day: 0.50     Years: 3.00     Pack years: 1.50     Types: Cigarettes     Last attempt to quit: 2003     Years since quittin.9     Smokeless tobacco: Never Used     Tobacco comment: social smoking   Substance and Sexual Activity     Alcohol use: Yes     Comment: social     Drug use: No     Sexual activity: Yes     Partners: Female   Lifestyle     Physical activity:     Days per week: Not on file     Minutes per session: Not on file     Stress: Not on file   Relationships     Social connections:     Talks on phone: Not on file     Gets together: Not on file     Attends Zoroastrian service: Not on file     Active member of club or organization: Not on file     Attends meetings of clubs or organizations: Not on file     Relationship status: Not on file     Intimate partner violence:     Fear of current or ex partner: Not on file     Emotionally abused: Not on file     Physically abused: Not on file     Forced sexual activity: Not on file   Other Topics Concern     Parent/sibling w/ CABG, MI or angioplasty before 65F 55M? No   Social History Narrative     Not on file       REVIEW OF SYSTEMS: Patient Supplied Answers to Review of Systems   ENT ROS 2020   Neurology Headache   Ears, Nose, Throat -   Musculoskeletal -            The remainder of the 10 point ROS is negative    PHYSICIAL EXAMINATION:  Constitutional: The patient was well-groomed and in no acute distress.   Skin: Warm and pink.  Psychiatric: The  patient's affect was calm, cooperative, and appropriate.   Respiratory: Breathing comfortably without stridor or exertion of accessory muscles.  Eyes: Pupils were equal and reactive. Extraocular movement intact.   Head: Normocephalic and atraumatic. No lesions or scars.  Ears: Patient placed under the microscope for microscopic evaluation and cleaning of cerumen which was obscuring full visualization and complete assessment of both TMs. Under high power magnification, the right ear was examined and cleaned of cerumen using curet, alligator forceps, and suction.  After cleaning, TM is fully visualized and has normal position with normal middle ear aeration. The left ear was then cleaned and inspected using microscope, instruments and similar techniques. After cleaning of cerumen, the TM has normal position with normal aeration to middle ear.  Nose: Sinuses were nontender. Anterior rhinoscopy revealed midline septum and absence of purulence or polyps.  Oral Cavity: Normal tongue, floor of mouth, buccal mucosa, and palate. No abnormal lymph tissue in the oropharynx.   Neck: The parotid is soft without masses. Supple with normal laryngeal and tracheal landmarks.   Lymphatic: There is no palpable lymphadenopathy or other masses in the neck.   Neurologic: Alert and oriented x 3. Cranial nerves III-XI within normal limits. Voice quality normal.  Cerebellar Function Tests:  Grossly normal    Audiogram: Audiogram performed shows completely normal hearing in both ears through all frequencies.  He has 100% discrimination bilaterally.    IMPRESSION AND PLAN:   1. Right tinnitus: With negative MRI, recommend we just monitor.  No further treatment needed, monitor.  2. Dizziness: He has not had any further dizziness, no treatment needed, monitor.    Patient will follow-up in 1 year, sooner if any return of dizziness or problems.    Thank you very much for the opportunity to participate in the care of your patient.    Rick L Nissen  MD

## 2020-01-14 NOTE — NURSING NOTE
"Chief Complaint   Patient presents with     RECHECK     dizziness, right ear pressure , tinntus      Blood pressure 122/83, pulse 64, temperature 97.8  F (36.6  C), resp. rate 18, height 1.727 m (5' 8\"), weight 78.5 kg (173 lb).    Woody Frank LPN    "

## 2020-01-14 NOTE — PROGRESS NOTES
AUDIOLOGY REPORT    SUMMARY: Audiology visit completed. See audiogram for results.      RECOMMENDATIONS: Follow-up with ENT.    Radu Mccurdy, [facility]  Licensed Audiologist  [place] License #[id number]

## 2020-01-14 NOTE — LETTER
1/14/2020       RE: Jayce Myers  [address] [facility] [address]     Dear Colleague,    Thank you for referring your patient, Jayce Myers, to the [facility] EAR NOSE AND THROAT at [facility]. Please see a copy of my visit note below.    Dear Chapo Lewis:    I had the pleasure of seeing Jayce Myers in followup today at the [facility].    CHIEF COMPLAINT: Dizziness    HISTORY OF PRESENT ILLNESS: Patient is a 49-year-old in today for follow-up from his last visit 11/12/2019.  He has had 3 episodes of spinning dizziness that lasted for about a week, 1 6 years ago, the second 1 3 years ago, and last November.  He has had some right tinnitus since the last episode and the right fullness occasionally.  We did do an MRI scan after his last visit and that has been negative.  He did see Dr. Meyers and was put on a diuretic, he was on that for short time and has stopped that.  On his follow-up today, he feels the dizziness is doing well.  He has not had any more episodes.  We did discuss that his MRI is negative.  She still has the right tinnitus.  He is happy with the dizziness being absent.    MEDICATIONS: Please refer to the detailed medication reconciliation performed by my nurse today, which I have reviewed and signed.     ALLERGIES:    Allergies   Allergen Reactions     Pollen Extract Itching     Hayfever       HABITS: Social History    Substance and Sexual Activity      Alcohol use: Yes        Comment: social     History   Smoking Status     Former Smoker     Packs/day: 0.50     Years: 3.00     Types: Cigarettes     Quit date: 1/26/2003   Smokeless Tobacco     Never Used     Comment: social smoking         PAST MEDICAL HISTORY:  Please see today's intake form (for the remainder of the PMH) which I reviewed and signed.  Past Medical History:   Diagnosis Date     Nevus     left leg        FAMILY HISTORY/SOCIAL HISTORY:     Family History   Problem Relation Age of Onset     Cancer Mother         salivary gland?     Thyroid Disease Mother      Diabetes Mother         DM2     Anemia Sister         Jayce ROLON does not known cause     Cancer - colorectal No family hx of       Social History     Socioeconomic History     Marital status:      Spouse name: Not on file     Number of children: Not on file     Years of education: Not on file     Highest education level: Not on file   Occupational History     Not on file   Social Needs     Financial resource strain: Not on file     Food insecurity:     Worry: Not on file     Inability: Not on file     Transportation needs:     Medical: Not on file     Non-medical: Not on file   Tobacco Use     Smoking status: Former Smoker     Packs/day: 0.50     Years: 3.00     Pack years: 1.50     Types: Cigarettes     Last attempt to quit: 2003     Years since quittin.9     Smokeless tobacco: Never Used     Tobacco comment: social smoking   Substance and Sexual Activity     Alcohol use: Yes     Comment: social     Drug use: No     Sexual activity: Yes     Partners: Female   Lifestyle     Physical activity:     Days per week: Not on file     Minutes per session: Not on file     Stress: Not on file   Relationships     Social connections:     Talks on phone: Not on file     Gets together: Not on file     Attends Druze service: Not on file     Active member of club or organization: Not on file     Attends meetings of clubs or organizations: Not on file     Relationship status: Not on file     Intimate partner violence:     Fear of current or ex partner: Not on file     Emotionally abused: Not on file     Physically abused: Not on file     Forced sexual activity: Not on file   Other Topics Concern     Parent/sibling w/ CABG, MI or angioplasty before 65F 55M? No   Social History Narrative     Not on file       REVIEW OF SYSTEMS: Patient Supplied Answers to Review of Systems  [facility] ROS  1/14/2020   Neurology Headache   Ears, Nose, Throat -   Musculoskeletal -            The remainder of the 10 point ROS is negative    PHYSICIAL EXAMINATION:  Constitutional: The patient was well-groomed and in no acute distress.   Skin: Warm and pink.  Psychiatric: The patient's affect was calm, cooperative, and appropriate.   Respiratory: Breathing comfortably without stridor or exertion of accessory muscles.  Eyes: Pupils were equal and reactive. Extraocular movement intact.   Head: Normocephalic and atraumatic. No lesions or scars.  Ears: Patient placed under the microscope for microscopic evaluation and cleaning of cerumen which was obscuring full visualization and complete assessment of both TMs. Under high power magnification, the right ear was examined and cleaned of cerumen using curet, alligator forceps, and suction.  After cleaning, TM is fully visualized and has normal position with normal middle ear aeration. The left ear was then cleaned and inspected using microscope, instruments and similar techniques. After cleaning of cerumen, the TM has normal position with normal aeration to middle ear.  Nose: Sinuses were nontender. Anterior rhinoscopy revealed midline septum and absence of purulence or polyps.  Oral Cavity: Normal tongue, floor of mouth, buccal mucosa, and palate. No abnormal lymph tissue in the oropharynx.   Neck: The parotid is soft without masses. Supple with normal laryngeal and tracheal landmarks.   Lymphatic: There is no palpable lymphadenopathy or other masses in the neck.   Neurologic: Alert and oriented x 3. Cranial nerves III-XI within normal limits. Voice quality normal.  Cerebellar Function Tests:  Grossly normal    Audiogram: Audiogram performed shows completely normal hearing in both ears through all frequencies.  He has 100% discrimination bilaterally.    IMPRESSION AND PLAN:   1. Right tinnitus: With negative MRI, recommend we just monitor.  No further treatment needed,  monitor.  2. Dizziness: He has not had any further dizziness, no treatment needed, monitor.    Patient will follow-up in 1 year, sooner if any return of dizziness or problems.    Thank you very much for the opportunity to participate in the care of your patient.    Rick L Nissen MD

## 2020-03-02 ENCOUNTER — HEALTH MAINTENANCE LETTER (OUTPATIENT)
Age: 50
End: 2020-03-02

## 2020-11-19 ENCOUNTER — TELEPHONE (OUTPATIENT)
Dept: OTOLARYNGOLOGY | Facility: CLINIC | Age: 50
End: 2020-11-19

## 2020-12-20 ENCOUNTER — HEALTH MAINTENANCE LETTER (OUTPATIENT)
Age: 50
End: 2020-12-20

## 2021-04-18 ENCOUNTER — HEALTH MAINTENANCE LETTER (OUTPATIENT)
Age: 51
End: 2021-04-18

## 2021-06-09 ENCOUNTER — OFFICE VISIT (OUTPATIENT)
Dept: FAMILY MEDICINE | Facility: CLINIC | Age: 51
End: 2021-06-09
Payer: COMMERCIAL

## 2021-06-09 VITALS
TEMPERATURE: 97.9 F | SYSTOLIC BLOOD PRESSURE: 120 MMHG | OXYGEN SATURATION: 98 % | DIASTOLIC BLOOD PRESSURE: 90 MMHG | BODY MASS INDEX: 26.61 KG/M2 | WEIGHT: 175 LBS | HEART RATE: 71 BPM

## 2021-06-09 DIAGNOSIS — Z00.00 ROUTINE GENERAL MEDICAL EXAMINATION AT A HEALTH CARE FACILITY: Primary | ICD-10-CM

## 2021-06-09 DIAGNOSIS — Z12.11 SPECIAL SCREENING FOR MALIGNANT NEOPLASMS, COLON: ICD-10-CM

## 2021-06-09 DIAGNOSIS — H81.01 MENIERE'S DISEASE, RIGHT: ICD-10-CM

## 2021-06-09 DIAGNOSIS — H81.21 VESTIBULAR NEURONITIS OF RIGHT EAR: ICD-10-CM

## 2021-06-09 DIAGNOSIS — Z00.00 ROUTINE GENERAL MEDICAL EXAMINATION AT A HEALTH CARE FACILITY: ICD-10-CM

## 2021-06-09 DIAGNOSIS — F45.8 TEETH GRINDING: ICD-10-CM

## 2021-06-09 PROBLEM — M94.0 COSTOCHONDRITIS, ACUTE: Status: RESOLVED | Noted: 2017-04-14 | Resolved: 2021-06-09

## 2021-06-09 LAB
CHOLEST SERPL-MCNC: 260 MG/DL
HDLC SERPL-MCNC: 44 MG/DL
LDLC SERPL CALC-MCNC: 189 MG/DL
NONHDLC SERPL-MCNC: 216 MG/DL
PSA SERPL-ACNC: 0.56 UG/L (ref 0–4)
TRIGL SERPL-MCNC: 135 MG/DL

## 2021-06-09 PROCEDURE — G0103 PSA SCREENING: HCPCS | Performed by: FAMILY MEDICINE

## 2021-06-09 PROCEDURE — 99396 PREV VISIT EST AGE 40-64: CPT | Performed by: FAMILY MEDICINE

## 2021-06-09 PROCEDURE — 36415 COLL VENOUS BLD VENIPUNCTURE: CPT | Performed by: FAMILY MEDICINE

## 2021-06-09 PROCEDURE — 80061 LIPID PANEL: CPT | Performed by: FAMILY MEDICINE

## 2021-06-09 NOTE — PROGRESS NOTES
SUBJECTIVE:   CC: Jayce Myers is an 51 year old male who presents for preventive health visit.     Patient has been advised of split billing requirements and indicates understanding: Yes  Healthy Habits:    Do you get at least three servings of calcium containing foods daily (dairy, green leafy vegetables, etc.)? yes    Amount of exercise or daily activities, outside of work: 16-20 miles per week    Problems taking medications regularly No    Medication side effects: No    Have you had an eye exam in the past two years? yes    Do you see a dentist twice per year? no    Do you have sleep apnea, excessive snoring or daytime drowsiness?yes- excessive snoring     Gets vestibular neuritis on and off, may be Ménière's type symptoms.  Grinds teeth.  Considering getting the Covid vaccine    Today's PHQ-2 Score:   PHQ-2 (  [employer]) 2019   Q1: Little interest or pleasure in doing things 0 1   Q2: Feeling down, depressed or hopeless 0 0   PHQ-2 Score 0 1   Q1: Little interest or pleasure in doing things - Several days   Q2: Feeling down, depressed or hopeless - Not at all   PHQ-2 Score - 1       Abuse: Current or Past(Physical, Sexual or Emotional)- No  Do you feel safe in your environment? Yes    Have you ever done Advance Care Planning? (For example, a Health Directive, POLST, or a discussion with a medical provider or your loved ones about your wishes): No, advance care planning information given to patient to review.  Patient declined advance care planning discussion at this time.    Social History     Tobacco Use     Smoking status: Former Smoker     Packs/day: 0.50     Years: 3.00     Pack years: 1.50     Types: Cigarettes     Quit date: 2003     Years since quittin.3     Smokeless tobacco: Never Used     Tobacco comment: social smoking   Substance Use Topics     Alcohol use: Yes     Comment: social     If you drink alcohol do you typically have >3 drinks per day or >7 drinks per week?  No                      Last PSA: No results found for: PSA    Reviewed orders with patient. Reviewed health maintenance and updated orders accordingly - Yes  Lab work is in process  Labs reviewed in EPIC    Reviewed and updated as needed this visit by clinical staff                 Reviewed and updated as needed this visit by Provider                    ROS:  CONSTITUTIONAL: NEGATIVE for fever, chills, change in weight  INTEGUMENTARY/SKIN: NEGATIVE for worrisome rashes, moles or lesions  EYES: NEGATIVE for vision changes or irritation  ENT: NEGATIVE for ear, mouth and throat problems  RESP: NEGATIVE for significant cough or SOB  CV: NEGATIVE for chest pain, palpitations or peripheral edema  GI: NEGATIVE for nausea, abdominal pain, heartburn, or change in bowel habits   male: negative for dysuria, hematuria, decreased urinary stream, erectile dysfunction, urethral discharge  MUSCULOSKELETAL: NEGATIVE for significant arthralgias or myalgia  NEURO: NEGATIVE for weakness, dizziness or paresthesias  PSYCHIATRIC: NEGATIVE for changes in mood or affect    OBJECTIVE:   Wt 79.4 kg (175 lb)   BMI 26.61 kg/m    EXAM:  GENERAL: healthy, alert and no distress  EYES: Eyes grossly normal to inspection, PERRL and conjunctivae and sclerae normal  HENT: ear canals and TM's normal, nose and mouth without ulcers or lesions  NECK: no adenopathy, no asymmetry, masses, or scars and thyroid normal to palpation  RESP: lungs clear to auscultation - no rales, rhonchi or wheezes  CV: regular rate and rhythm, normal S1 S2, no S3 or S4, no murmur, click or rub, no peripheral edema and peripheral pulses strong  ABDOMEN: soft, nontender, no hepatosplenomegaly, no masses and bowel sounds normal   (male): normal male genitalia without lesions or urethral discharge, no hernia  RECTAL: normal sphincter tone, no rectal masses, prostate normal size, smooth, nontender without nodules or masses  MS: no gross musculoskeletal defects noted, no  "edema  SKIN: no suspicious lesions or rashes  NEURO: Normal strength and tone, mentation intact and speech normal  PSYCH: mentation appears normal, affect normal/bright    Diagnostic Test Results:  Labs reviewed in Epic    ASSESSMENT/PLAN:       ICD-10-CM    1. Routine general medical examination at a health care facility  Z00.00 **Prostate spec antigen screen FUTURE anytime     Lipid panel reflex to direct LDL Fasting   2. Meniere's disease, right  H81.01    3. Vestibular neuronitis of right ear  H81.21    4. Teeth grinding  F45.8    5. Special screening for malignant neoplasms, colon  Z12.11 COLOGJACEY([employer])       Patient has been advised of split billing requirements and indicates understanding: Yes  COUNSELING:  Reviewed preventive health counseling, as reflected in patient instructions       Regular exercise       Healthy diet/nutrition       Vision screening       Colon cancer screening       Prostate cancer screening       Covid vaccination    Estimated body mass index is 26.61 kg/m  as calculated from the following:    Height as of 1/14/20: 1.727 m (5' 8\").    Weight as of this encounter: 79.4 kg (175 lb).    Weight management plan: Discussed healthy diet and exercise guidelines    He reports that he quit smoking about 18 years ago. His smoking use included cigarettes. He has a 1.50 pack-year smoking history. He has never used smokeless tobacco.    Consider glaucoma test at eye dr  Night time mouth guard  Schedule covid vaccine    Chapo Maldonado MD  [facility]  "

## 2021-06-09 NOTE — PATIENT INSTRUCTIONS
Consider glaucoma test at eye dr  Night time mouth guard  Schedule covid vaccine    Preventive Health Recommendations  Male Ages 50 - 64    Yearly exam:             See your health care provider every year in order to  o   Review health changes.   o   Discuss preventive care.    o   Review your medicines if your doctor has prescribed any.     Have a cholesterol test every 5 years, or more frequently if you are at risk for high cholesterol/heart disease.     Have a diabetes test (fasting glucose) every three years. If you are at risk for diabetes, you should have this test more often.     Have a colonoscopy at age 50, or have a yearly FIT test (stool test). These exams will check for colon cancer.      Talk with your health care provider about whether or not a prostate cancer screening test (PSA) is right for you.    You should be tested each year for STDs (sexually transmitted diseases), if you re at risk.     Shots: Get a flu shot each year. Get a tetanus shot every 10 years.     Nutrition:    Eat at least 5 servings of fruits and vegetables daily.     Eat whole-grain bread, whole-wheat pasta and brown rice instead of white grains and rice.     Get adequate Calcium and Vitamin D.     Lifestyle    Exercise for at least 150 minutes a week (30 minutes a day, 5 days a week). This will help you control your weight and prevent disease.     Limit alcohol to one drink per day.     No smoking.     Wear sunscreen to prevent skin cancer.     See your dentist every six months for an exam and cleaning.     See your eye doctor every 1 to 2 years.

## 2021-06-14 NOTE — PROGRESS NOTES
This encounter was created solely for the purpose of releasing the future order that was placed for Cologuard.  This is a necessary step in order for the results to be abstracted once they are available.  Bakari Wolfe

## 2021-06-16 NOTE — RESULT ENCOUNTER NOTE
Surjit Eduardo: Your recent results look good except LDL bad cholesterol is not at goal less than 130.  Recommend diet and exercise, recheck in 1 year; no medication needed unless 10-year risk is equal to or greater than 10% and it is currently at 6%.  Contact if questions nice to see you, continue to stay healthy    Chapo  The 10-year ASCVD risk score (Becky KOENIG Jr., et al., 2013) is: 6%    Values used to calculate the score:      Age: 51 years      Sex: Male      Is Non- : No      Diabetic: No      Tobacco smoker: No      Systolic Blood Pressure: 120 mmHg      Is BP treated: No      HDL Cholesterol: 44 mg/dL      Total Cholesterol: 260 mg/dL

## 2021-07-01 LAB — COLOGUARD-ABSTRACT: NEGATIVE

## 2021-10-03 ENCOUNTER — HEALTH MAINTENANCE LETTER (OUTPATIENT)
Age: 51
End: 2021-10-03

## 2021-11-08 ENCOUNTER — OFFICE VISIT (OUTPATIENT)
Dept: FAMILY MEDICINE | Facility: CLINIC | Age: 51
End: 2021-11-08
Payer: COMMERCIAL

## 2021-11-08 DIAGNOSIS — Z23 NEED FOR PROPHYLACTIC VACCINATION AND INOCULATION AGAINST INFLUENZA: Primary | ICD-10-CM

## 2021-11-08 PROCEDURE — 99207 PR NO CHARGE NURSE ONLY: CPT

## 2021-11-08 PROCEDURE — 90471 IMMUNIZATION ADMIN: CPT

## 2021-11-08 PROCEDURE — 90682 RIV4 VACC RECOMBINANT DNA IM: CPT

## 2021-11-08 NOTE — PROGRESS NOTES
Flu vaccine given tolerated well, requested wait for 15 min    Melani Meredith RN   [facility]

## 2022-07-10 ENCOUNTER — HEALTH MAINTENANCE LETTER (OUTPATIENT)
Age: 52
End: 2022-07-10

## 2022-09-10 ENCOUNTER — HEALTH MAINTENANCE LETTER (OUTPATIENT)
Age: 52
End: 2022-09-10

## 2023-07-23 ENCOUNTER — HEALTH MAINTENANCE LETTER (OUTPATIENT)
Age: 53
End: 2023-07-23

## 2024-08-02 DIAGNOSIS — Z12.11 COLON CANCER SCREENING: ICD-10-CM

## 2024-08-16 ENCOUNTER — ORDERS ONLY (AUTO-RELEASED) (OUTPATIENT)
Dept: ADMISSION | Facility: CLINIC | Age: 54
End: 2024-08-16

## 2024-08-16 DIAGNOSIS — Z12.11 COLON CANCER SCREENING: ICD-10-CM

## 2024-09-15 ENCOUNTER — HEALTH MAINTENANCE LETTER (OUTPATIENT)
Age: 54
End: 2024-09-15